# Patient Record
Sex: MALE | Race: WHITE | NOT HISPANIC OR LATINO | Employment: OTHER | URBAN - METROPOLITAN AREA
[De-identification: names, ages, dates, MRNs, and addresses within clinical notes are randomized per-mention and may not be internally consistent; named-entity substitution may affect disease eponyms.]

---

## 2017-02-14 ENCOUNTER — LAB CONVERSION - ENCOUNTER (OUTPATIENT)
Dept: OTHER | Facility: OTHER | Age: 67
End: 2017-02-14

## 2017-02-14 LAB — PROSTATE SPECIFIC ANTIGEN TOTAL (HISTORICAL): 14.2 NG/ML

## 2017-02-15 ENCOUNTER — ALLSCRIPTS OFFICE VISIT (OUTPATIENT)
Dept: OTHER | Facility: OTHER | Age: 67
End: 2017-02-15

## 2017-02-15 LAB
CLARITY UR: NORMAL
COLOR UR: YELLOW
GLUCOSE (HISTORICAL): 2000
HGB UR QL STRIP.AUTO: NORMAL
KETONES UR STRIP-MCNC: NORMAL MG/DL
PH UR STRIP.AUTO: 5 [PH]
PROT UR STRIP-MCNC: NORMAL MG/DL
SP GR UR STRIP.AUTO: 1.02

## 2017-03-10 ENCOUNTER — GENERIC CONVERSION - ENCOUNTER (OUTPATIENT)
Dept: OTHER | Facility: OTHER | Age: 67
End: 2017-03-10

## 2017-03-22 ENCOUNTER — ALLSCRIPTS OFFICE VISIT (OUTPATIENT)
Dept: OTHER | Facility: OTHER | Age: 67
End: 2017-03-22

## 2017-03-22 ENCOUNTER — LAB CONVERSION - ENCOUNTER (OUTPATIENT)
Dept: OTHER | Facility: OTHER | Age: 67
End: 2017-03-22

## 2017-03-22 LAB
BUN SERPL-MCNC: 16 MG/DL (ref 7–25)
BUN/CREA RATIO (HISTORICAL): ABNORMAL (CALC) (ref 6–22)
CALCIUM SERPL-MCNC: 9.2 MG/DL (ref 8.6–10.3)
CHLORIDE SERPL-SCNC: 100 MMOL/L (ref 98–110)
CO2 SERPL-SCNC: 29 MMOL/L (ref 20–31)
CREAT SERPL-MCNC: 0.87 MG/DL (ref 0.7–1.25)
EGFR AFRICAN AMERICAN (HISTORICAL): 103 ML/MIN/1.73M2
EGFR-AMERICAN CALC (HISTORICAL): 89 ML/MIN/1.73M2
GLUCOSE (HISTORICAL): 171 MG/DL (ref 65–99)
HBA1C MFR BLD HPLC: 8.8 % OF TOTAL HGB
POTASSIUM SERPL-SCNC: 4.6 MMOL/L (ref 3.5–5.3)
SODIUM SERPL-SCNC: 136 MMOL/L (ref 135–146)

## 2017-04-05 ENCOUNTER — ALLSCRIPTS OFFICE VISIT (OUTPATIENT)
Dept: OTHER | Facility: OTHER | Age: 67
End: 2017-04-05

## 2017-05-03 ENCOUNTER — TRANSCRIBE ORDERS (OUTPATIENT)
Dept: ADMINISTRATIVE | Facility: HOSPITAL | Age: 67
End: 2017-05-03

## 2017-05-03 DIAGNOSIS — R91.1 COIN LESION: Primary | ICD-10-CM

## 2017-06-13 ENCOUNTER — ALLSCRIPTS OFFICE VISIT (OUTPATIENT)
Dept: OTHER | Facility: OTHER | Age: 67
End: 2017-06-13

## 2017-06-15 ENCOUNTER — ALLSCRIPTS OFFICE VISIT (OUTPATIENT)
Dept: OTHER | Facility: OTHER | Age: 67
End: 2017-06-15

## 2017-06-16 ENCOUNTER — GENERIC CONVERSION - ENCOUNTER (OUTPATIENT)
Dept: OTHER | Facility: OTHER | Age: 67
End: 2017-06-16

## 2017-06-16 ENCOUNTER — LAB CONVERSION - ENCOUNTER (OUTPATIENT)
Dept: OTHER | Facility: OTHER | Age: 67
End: 2017-06-16

## 2017-06-16 LAB
A/G RATIO (HISTORICAL): 1.3 (CALC) (ref 1–2.5)
ALBUMIN SERPL BCP-MCNC: 3.7 G/DL (ref 3.6–5.1)
ALP SERPL-CCNC: 94 U/L (ref 40–115)
ALT SERPL W P-5'-P-CCNC: 19 U/L (ref 9–46)
AST SERPL W P-5'-P-CCNC: 14 U/L (ref 10–35)
BILIRUB SERPL-MCNC: 0.5 MG/DL (ref 0.2–1.2)
BUN SERPL-MCNC: 15 MG/DL (ref 7–25)
BUN/CREA RATIO (HISTORICAL): ABNORMAL (CALC) (ref 6–22)
CALCIUM SERPL-MCNC: 9.4 MG/DL (ref 8.6–10.3)
CHLORIDE SERPL-SCNC: 99 MMOL/L (ref 98–110)
CO2 SERPL-SCNC: 28 MMOL/L (ref 20–31)
CREAT SERPL-MCNC: 0.8 MG/DL (ref 0.7–1.25)
EGFR AFRICAN AMERICAN (HISTORICAL): 106 ML/MIN/1.73M2
EGFR-AMERICAN CALC (HISTORICAL): 91 ML/MIN/1.73M2
GAMMA GLOBULIN (HISTORICAL): 2.8 G/DL (CALC) (ref 1.9–3.7)
GLUCOSE (HISTORICAL): 134 MG/DL (ref 65–99)
HBA1C MFR BLD HPLC: 8 % OF TOTAL HGB
POTASSIUM SERPL-SCNC: 4.5 MMOL/L (ref 3.5–5.3)
SODIUM SERPL-SCNC: 139 MMOL/L (ref 135–146)
TOTAL PROTEIN (HISTORICAL): 6.5 G/DL (ref 6.1–8.1)

## 2017-06-19 DIAGNOSIS — E11.65 TYPE 2 DIABETES MELLITUS WITH HYPERGLYCEMIA (HCC): ICD-10-CM

## 2017-06-19 DIAGNOSIS — I10 ESSENTIAL (PRIMARY) HYPERTENSION: ICD-10-CM

## 2017-06-20 ENCOUNTER — ALLSCRIPTS OFFICE VISIT (OUTPATIENT)
Dept: OTHER | Facility: OTHER | Age: 67
End: 2017-06-20

## 2017-06-28 ENCOUNTER — ALLSCRIPTS OFFICE VISIT (OUTPATIENT)
Dept: OTHER | Facility: OTHER | Age: 67
End: 2017-06-28

## 2017-08-03 ENCOUNTER — LAB CONVERSION - ENCOUNTER (OUTPATIENT)
Dept: OTHER | Facility: OTHER | Age: 67
End: 2017-08-03

## 2017-08-03 LAB — PROSTATE SPECIFIC ANTIGEN TOTAL (HISTORICAL): 17.6 NG/ML

## 2017-08-15 DIAGNOSIS — R97.20 ELEVATED PROSTATE SPECIFIC ANTIGEN (PSA): ICD-10-CM

## 2017-08-16 ENCOUNTER — APPOINTMENT (OUTPATIENT)
Dept: LAB | Facility: HOSPITAL | Age: 67
End: 2017-08-16
Attending: UROLOGY
Payer: MEDICARE

## 2017-08-16 ENCOUNTER — ALLSCRIPTS OFFICE VISIT (OUTPATIENT)
Dept: OTHER | Facility: OTHER | Age: 67
End: 2017-08-16

## 2017-08-16 DIAGNOSIS — R97.20 ELEVATED PROSTATE SPECIFIC ANTIGEN (PSA): ICD-10-CM

## 2017-08-16 PROCEDURE — 87086 URINE CULTURE/COLONY COUNT: CPT

## 2017-08-18 LAB — BACTERIA UR CULT: NORMAL

## 2017-09-13 ENCOUNTER — HOSPITAL ENCOUNTER (OUTPATIENT)
Dept: RADIOLOGY | Facility: MEDICAL CENTER | Age: 67
Discharge: HOME | End: 2017-09-13
Payer: MEDICARE

## 2017-09-13 DIAGNOSIS — R91.1 COIN LESION: ICD-10-CM

## 2017-09-13 PROCEDURE — 71250 CT THORAX DX C-: CPT

## 2017-09-14 ENCOUNTER — GENERIC CONVERSION - ENCOUNTER (OUTPATIENT)
Dept: OTHER | Facility: OTHER | Age: 67
End: 2017-09-14

## 2017-09-14 PROCEDURE — 88344 IMHCHEM/IMCYTCHM EA MLT ANTB: CPT | Performed by: UROLOGY

## 2017-09-14 PROCEDURE — G0416 PROSTATE BIOPSY, ANY MTHD: HCPCS | Performed by: UROLOGY

## 2017-09-15 ENCOUNTER — LAB REQUISITION (OUTPATIENT)
Dept: LAB | Facility: HOSPITAL | Age: 67
End: 2017-09-15
Payer: MEDICARE

## 2017-09-15 DIAGNOSIS — E78.5 HYPERLIPIDEMIA: ICD-10-CM

## 2017-09-15 DIAGNOSIS — R97.20 ELEVATED PROSTATE SPECIFIC ANTIGEN (PSA): ICD-10-CM

## 2017-09-15 DIAGNOSIS — I10 ESSENTIAL (PRIMARY) HYPERTENSION: ICD-10-CM

## 2017-09-15 DIAGNOSIS — E11.65 TYPE 2 DIABETES MELLITUS WITH HYPERGLYCEMIA (HCC): ICD-10-CM

## 2017-09-15 DIAGNOSIS — C61 MALIGNANT NEOPLASM OF PROSTATE (HCC): ICD-10-CM

## 2017-09-18 ENCOUNTER — LAB CONVERSION - ENCOUNTER (OUTPATIENT)
Dept: OTHER | Facility: OTHER | Age: 67
End: 2017-09-18

## 2017-09-18 ENCOUNTER — GENERIC CONVERSION - ENCOUNTER (OUTPATIENT)
Dept: OTHER | Facility: OTHER | Age: 67
End: 2017-09-18

## 2017-09-18 LAB
A/G RATIO (HISTORICAL): 1.5 (CALC) (ref 1–2.5)
ALBUMIN SERPL BCP-MCNC: 3.9 G/DL (ref 3.6–5.1)
ALP SERPL-CCNC: 93 U/L (ref 40–115)
ALT SERPL W P-5'-P-CCNC: 20 U/L (ref 9–46)
AST SERPL W P-5'-P-CCNC: 15 U/L (ref 10–35)
BILIRUB SERPL-MCNC: 0.5 MG/DL (ref 0.2–1.2)
BUN SERPL-MCNC: 11 MG/DL (ref 7–25)
BUN/CREA RATIO (HISTORICAL): ABNORMAL (CALC) (ref 6–22)
CALCIUM SERPL-MCNC: 9.3 MG/DL (ref 8.6–10.3)
CHLORIDE SERPL-SCNC: 102 MMOL/L (ref 98–110)
CHOLEST SERPL-MCNC: 197 MG/DL
CHOLEST/HDLC SERPL: 4.6 (CALC)
CO2 SERPL-SCNC: 26 MMOL/L (ref 20–31)
CREAT SERPL-MCNC: 0.73 MG/DL (ref 0.7–1.25)
CREATININE, RANDOM URINE (HISTORICAL): 167 MG/DL (ref 20–370)
EGFR AFRICAN AMERICAN (HISTORICAL): 110 ML/MIN/1.73M2
EGFR-AMERICAN CALC (HISTORICAL): 95 ML/MIN/1.73M2
GAMMA GLOBULIN (HISTORICAL): 2.6 G/DL (CALC) (ref 1.9–3.7)
GLUCOSE (HISTORICAL): 43 MG/DL (ref 65–99)
HBA1C MFR BLD HPLC: 7.2 % OF TOTAL HGB
HDLC SERPL-MCNC: 43 MG/DL
LDL CHOLESTEROL (HISTORICAL): 129 MG/DL (CALC)
MAGNESIUM, UR (HISTORICAL): 3.6 MG/DL
MICROALBUMIN/CREATININE RATIO (HISTORICAL): 22 MCG/MG CREAT
NON-HDL-CHOL (CHOL-HDL) (HISTORICAL): 154 MG/DL (CALC)
POTASSIUM SERPL-SCNC: 3.9 MMOL/L (ref 3.5–5.3)
SODIUM SERPL-SCNC: 140 MMOL/L (ref 135–146)
TOTAL PROTEIN (HISTORICAL): 6.5 G/DL (ref 6.1–8.1)
TRIGL SERPL-MCNC: 139 MG/DL

## 2017-10-03 ENCOUNTER — ALLSCRIPTS OFFICE VISIT (OUTPATIENT)
Dept: OTHER | Facility: OTHER | Age: 67
End: 2017-10-03

## 2017-10-03 LAB
COLOR UR: YELLOW
HGB UR QL STRIP.AUTO: NORMAL
KETONES UR STRIP-MCNC: -500 MG/DL
LEUKOCYTE ESTERASE UR QL STRIP: NORMAL
PH UR STRIP.AUTO: 5 [PH]
PROT UR STRIP-MCNC: NORMAL MG/DL
SP GR UR STRIP.AUTO: 1

## 2017-10-11 ENCOUNTER — ALLSCRIPTS OFFICE VISIT (OUTPATIENT)
Dept: OTHER | Facility: OTHER | Age: 67
End: 2017-10-11

## 2017-10-13 ENCOUNTER — HOSPITAL ENCOUNTER (OUTPATIENT)
Dept: RADIOLOGY | Age: 67
Discharge: HOME | End: 2017-10-13
Payer: MEDICARE

## 2017-10-13 DIAGNOSIS — C61 MALIGNANT NEOPLASM OF PROSTATE (HCC): ICD-10-CM

## 2017-10-13 PROCEDURE — 78306 BONE IMAGING WHOLE BODY: CPT

## 2017-10-13 PROCEDURE — A9503 TC99M MEDRONATE: HCPCS

## 2017-10-13 PROCEDURE — 74177 CT ABD & PELVIS W/CONTRAST: CPT

## 2017-10-13 RX ADMIN — IOHEXOL 100 ML: 350 INJECTION, SOLUTION INTRAVENOUS at 10:02

## 2017-10-18 ENCOUNTER — GENERIC CONVERSION - ENCOUNTER (OUTPATIENT)
Dept: OTHER | Facility: OTHER | Age: 67
End: 2017-10-18

## 2017-10-24 ENCOUNTER — GENERIC CONVERSION - ENCOUNTER (OUTPATIENT)
Dept: OTHER | Facility: OTHER | Age: 67
End: 2017-10-24

## 2017-10-24 ENCOUNTER — APPOINTMENT (OUTPATIENT)
Dept: RADIATION ONCOLOGY | Facility: HOSPITAL | Age: 67
End: 2017-10-24
Payer: MEDICARE

## 2017-10-24 PROCEDURE — 99215 OFFICE O/P EST HI 40 MIN: CPT | Performed by: RADIOLOGY

## 2017-10-31 NOTE — PROGRESS NOTES
Assessment    1  Uncontrolled type 2 diabetes mellitus, with long-term current use of insulin (250 02,V58 67) (E11 65,Z79 4)   2  Hyperlipidemia (272 4) (E78 5)   3  Hypertension (401 9) (I10)    Plan  Hyperlipidemia, Hypertension, Uncontrolled type 2 diabetes mellitus, with long-termcurrent use of insulin    · (1) COMPREHENSIVE METABOLIC PANEL; Status:Active; Requested KCZ:[id number]; Perform:[facility]; ZHA:[id number];FRFNIXW; Hypertension, Uncontrolled type 2 diabetes mellitus, with long-term current use of insulin; Ordered By:Garrett Tinoco;   · (1) MICROALBUMIN CREATININE RATIO, RANDOM URINE; Status:Active; Requestedfor:01Jan2018; Perform:[facility]; SVB:66YPO9051;WWNSHHT; Hypertension, Uncontrolled type 2 diabetes mellitus, with long-term current use of insulin; Ordered By:Garrett Tinoco;   · Follow-up visit in 4 Months Evaluation and Treatment  Follow-up  Status: Complete Done: 89YWH1069   Ordered; Hyperlipidemia, Hypertension, Uncontrolled type 2 diabetes mellitus, with long-term current use of insulin; Ordered By: Pricilla Sandifer Performed:  Due: 42IUG0692; Last Updated By: Media Ode; 10/11/2017 1:21:44 PM  Hyperlipidemia, Uncontrolled type 2 diabetes mellitus, with long-term current use ofinsulin    · (1) LIPID PANEL, FASTING; Status:Active; Requested LFT:[id number]; Perform:[facility]; USK:[id number];VHEGBSV;CDG:NCSIUKCNPTNMZG, Uncontrolled type 2 diabetes mellitus, with long-term current use of insulin; Ordered By:Garrett Tinoco; Uncontrolled type 2 diabetes mellitus, with long-term current use of insulin    · OneTouch Ultra 2 w/Device Kit; USE AS DIRECTED   Rx By: Pricilla Sandifer; Dispense: 0 Days ; #:1 Kit;  Refill: 0;Uncontrolled type 2 diabetes mellitus, with long-term current use of insulin; NIKKO = N; Verified Transmission to [facility]164; Last Updated By: System, SureScripts; 10/11/2017 1:19:42 PM   · NovoLOG FlexPen 100 UNIT/ML Subcutaneous Solution Pen-injector; 26 untisbefore breakfast and dinner and 24 before lunch   Rx By: Helen Agudelo; Dispense: 30 Days ; #:1 X 3 ML Pen (5 Pens); Refill: 5;Uncontrolled type 2 diabetes mellitus, with long-term current use of insulin; NIKKO = N; Verified Transmission to [facility]; Last Updated By: System, SureScripts; 10/11/2017 1:19:42 PM   · Toujeo SoloStar 300 UNIT/ML Subcutaneous Solution Pen-injector; take 70 units at bedtime   Rx By: Helen Agudelo; Dispense: 90 Days ; #:3 X 1 5 ML Pen (3 Pens); Refill: 3;For: Uncontrolled type 2 diabetes mellitus, with long-term current use of insulin; NIKKO = N; Verified Transmission to [place] [address]; Last Updated By: System, SureScripts; 10/11/2017 1:19:41 PM   · (1) HEMOGLOBIN A1C; Status:Active; Requested OGU:[id number]; Perform:[facility]; YGV:56OJJ9056;YSHAYOUNGO;OWCV 2 diabetes mellitus, with long-term current use of insulin; Ordered By:Cole Tinoco;    Discussion/Summary   uncontrolled type 2 diabetes with long-term insulin therapy - hemoglobin A1c improved and almost at goal- patient denies any hypoglycemic episodes  increase to do to 70 units at bedtime, NovoLog 26 units  before breakfast and dinner and 24 before lunch  Continue to monitor blood sugars 3 times a day and sent over fingerstick log in 2 weeks  2  Hyperlipidemia - LDL above goal, continue atorvastatin, focus on dietary modifications  And repeat labs prior to next visit  3  Hypertension- blood pressure at goal, continue current regimen Counseling Documentation With Imm: The patient was counseled regarding diagnostic results,-- instructions for management,-- risk factor reductions,-- impressions,-- risks and benefits of treatment options,-- importance of compliance with treatment  Patient's Capacity to Self-Care: Patient is able to Self-Care  Medication SE Review and Pt Understands Tx: Possible side effects of new medications were reviewed with the patient/guardian today   The treatment plan was reviewed with the patient/guardian  The patient/guardian understands and agrees with the treatment plan      Chief Complaint  Chief Complaint Free Text Note Form: Follow up  History of Present Illness  Diabetes: The patient is being seen for routine follow-up of Diabetes Mellitus 2  Current treatment includes Basal Insulin-- and-- NovoLog  See Medication List for current medication(s)  See Medication List for dosage(s)  Source of information reported by review of the patient's logbook and indicates that the patient checks his blood sugar four times per day  Fasting blood sugars: generally 150-200  Pre-prandial blood sugars: generally 150-200  Symptoms reported by the patient include extremity numbness-- and-- extremity paresthesias, but-- no polydipsia,-- no polyuria,-- no blurred vision,-- no lower extremity ulcers,-- no recent weight gain,-- no nausea,-- no recent weight loss-- and-- no edema  Family History: no diabetes mellitus type 1-- and-- no diabetes mellitus type 2  Hyperlipidemia (Follow-Up): The patient states his hyperlipidemia has been under good control since the last visit  Comorbid Illnesses: diabetes mellitus-- and-- hypertension  Symptoms: denies chest pain,-- denies intermittent leg claudication,-- denies muscle pain-- and-- denies muscle weakness  Associated symptoms include no focal neurologic deficits-- and-- no memory loss  Medications: the patient is adherent with his medication regimen  -- He denies medication side effects  Medication(s): a statin  Hypertension (Follow-Up): The patient presents for follow-up of essential hypertension  Symptoms: denies impaired vision,-- stable dyspnea,-- denies chest pain,-- denies intermittent leg claudication-- and-- denies lower extremity edema  Associated symptoms include no headache,-- no focal neurologic deficits-- and-- no memory loss  Medications: the patient is adherent with his medication regimen  -- He denies medication side effects  Medication(s): an ACE inhibitor  Review of Systems  Endo Adult ROS Male Established v2 - [facility]:  Constitutional/General: no recent weight gain,-- no recent weight loss,-- no poor energy/fatigue,-- no increased energy level,-- no insomnia/sleep problems,-- no fever-- and-- no feeling weak  Heart: no high blood pressure,-- no chest pain/tightness,-- no rapid/racing heart rate-- and-- no palpitations  Genitourinary - Urinary no frequent urination,-- no excess urination-- and-- no urinating during the night  Eyes: no blurred vision,-- no double vision,-- no bulging eyes,-- no gritty/scratchy eyes-- and-- no excessive tearing  Mouth / Throat: no hoarseness-- and-- no difficulty swallowing  Neck: no lumps,-- no swollen glands,-- no neck pain,-- no neck stiffness-- and-- no enlarged thyroid  Respiratory: no wheezing,-- no asthma-- and-- no persistent cough  Musculoskeletal: no muscle aches/pain,-- no joint aches/pain-- and-- no muscle weakness  Skin & Hair: no dry skin,-- no acne,-- the hair texture was not oily,-- no hair loss-- and-- no excessive hair growth  Gastrointestinal: no constipation,-- no diarrhea,-- no waking at night to drink-- and-- no stomach ache  Neurological: no blackouts,-- no weakness-- and-- no tremors  Genital: no testicular pain,-- no testicular lumps/bumps/mass-- and-- does not perform monthly testicular exam   Endocrine: no feeling hot frequently,-- no feeling cold frequently,-- no shifts between feeling hot and cold,-- no cold hands or feet,-- no excessive sweating,-- no thyroid problems,-- no blood sugar problems,-- no excessive thirst,-- no excessive hunger,-- no change in shoe size,-- no nausea or vomiting-- and-- no shaky hands  ROS Reviewed:   ROS reviewed  Active Problems  1  Adenocarcinoma of prostate (185) (C61)   2  Elevated PSA (790 93) (R97 20)   3  Hyperlipidemia (272 4) (E78 5)   4  Hypertension (401 9) (I10)   5   Obesity (278 00) (E66 9)    Past Medical History  1  History of Coronary artery disease (414 00) (I25 10)   2  History of cardiac disorder (V12 50) (Z86 79)  Active Problems And Past Medical History Reviewed: The active problems and past medical history were reviewed and updated today  Surgical History  1  History of Cath Stent 1 Initial   2  History of Needle Biopsy Of Prostate  Surgical History Reviewed: The surgical history was reviewed and updated today  Family History  Family History Reviewed: The family history was reviewed and updated today  Social History     · Daily caffeine consumption   · Never a smoker   · No drug use   · Occasional alcohol use  Social History Reviewed: The social history was reviewed and updated today  Current Meds   1  Aspirin 325 MG Oral Tablet; Therapy: (Recorded:51Zyf2134) to Recorded   2  Atorvastatin Calcium 40 MG Oral Tablet; Therapy: (Recorded:86Ogx5115) to Recorded   3  BD Pen Needle Pam U/F 32G X 4 MM Miscellaneous; USE 4 TIMES A DAY; Therapy: 94RHS9970 to (BXEPYFNZ:77CIB7922)  Requested for: 20XPA7834; Last Rx:15Oxt2323 Ordered   4  Breo Ellipta 100-25 MCG/INH Inhalation Aerosol Powder Breath Activated; Therapy: (Recorded:02Mar2016) to Recorded   5  Effient 10 MG Oral Tablet; Therapy: (Recorded:98Avu5841) to Recorded   6  Famotidine 20 MG Oral Tablet; Therapy: (Recorded:83Iqu4027) to Recorded   7  Flonase 50 MCG/ACT SUSP; Therapy: (Recorded:19Fwh4668) to Recorded   8  HydroCHLOROthiazide 12 5 MG Oral Tablet; Therapy: (Recorded:02Mar2016) to Recorded   9  Lisinopril 2 5 MG Oral Tablet; Therapy: (Recorded:54Suv8595) to Recorded   10  Metoprolol Tartrate 100 MG Oral Tablet; Therapy: (Recorded:40Hhb3957) to Recorded   11  NovoLOG FlexPen 100 UNIT/ML Subcutaneous Solution Pen-injector; 24 untis before  meals; Therapy: 39UFA2006 to (OENJAHGL:10ICW6289)  Requested for: 58Wud1499; Last  Rx:03Nuh0261 Ordered   12   OneTouch Lancets Miscellaneous; USE TO TEST 4  Times daily; Therapy: 22JUF4678 to (TJKYGQTO:17ZCW8421)  Requested for: 90EEC3936; Last  Rx:54Lsh8398 Ordered   13  OneTouch Ultra Blue In [employer]; check BS 4 times daily; Therapy: 83UTO6771 to (Evaluate:28Jan2018)  Requested for: 42Ukq6367; Last  Rx:72Vmn8249 Ordered   14  Toujeo SoloStar 300 UNIT/ML Subcutaneous Solution Pen-injector; take 65 units  at  bedtime  Requested for: 69RBM5259; Last Rx:05Oct2017 Ordered   15  Ventolin  (90 Base) MCG/ACT Inhalation Aerosol Solution; Therapy: (Recorded:02Mar2016) to Recorded  Medication List Reviewed: The medication list was reviewed and updated today  Allergies  1  No Known Drug Allergies    Vitals  Vital Signs    Recorded: 90JAE0753 12:56PM   Heart Rate 68   Systolic 341   Diastolic 68   Height 5 ft 5 in   Weight 212 lb 0 96 oz   BMI Calculated 35 29   BSA Calculated 2 03       Physical Exam   Constitutional  General appearance: No acute distress, well appearing and well nourished  Eyes  Conjunctiva and lids: No swelling, erythema, or discharge  Pupils: Equal, round and reactive to light  The sclera are anicteric  Extraocular movements are intact  Ears, Nose, Mouth, and Throat  External inspection of ears, nose and lips: Normal    Exam of Head: The head is atraumatic and normocephalic  Neck: The neck is supple  The thyroid is normal in size with no palpable nodules  Pulmonary  Auscultation of lungs: Clear to auscultation bilaterally with normal chest expansion  Cardiovascular  Auscultation of heart: Normal rate and rhythm with no murmurs, gallops or rubs  Examination of extremities for edema and/or varicosities: Normal    Abdomen  Abdomen: Abdomen is soft, non-tender with normal bowel sounds  Lymphatic  Palpation of lymph nodes: No supraclavicular or suboccipital lymphadenopathy     Musculoskeletal  Gait and station: Normal    Inspection/palpation of joints, bones, and muscles: Muscle bulk and tone is normal    Skin  Skin and subcutaneous tissue: Normal skin temperature and color  Neurologic  Motor Strength: Strength is 5/5 bilaterally  Psychiatric  Orientation to person, place and time: Normal    Mood and affect: Affect and attention span are normal        Results/Data  *VB - Foot Exam 92OJO1838 12:00AM Sandra Yoo     Test Name Result Flag Reference   FOOT EXAM 63JHG9487       (1) LIPID PANEL, FASTING 97Csa9623 08:54AM Emelia Pennschner     Test Name Result Flag Reference   CHOLESTEROL, TOTAL 197 mg/dL  <200   HDL CHOLESTEROL 43 mg/dL  >13   TRIGLICERIDES 145 mg/dL  <150   LDL-CHOLESTEROL 129 mg/dL (calc) H      Reference range: <100   Desirable range <100 mg/dL for patients with CHD or diabetes and <70 mg/dL for diabetic patients with known heart disease  LDL-C is now calculated using the Bennie-Lacey  calculation, which is a validated novel method providing  better accuracy than the Friedewald equation in the  estimation of LDL-C  Rachana Mathew  [place] [address];494(91): 7478-2221  (http://[employer]/faq/AAT266)   CHOL/HDLC RATIO 4 6 (calc)  <5 0   NON HDL CHOLESTEROL 154 mg/dL (calc) H <130     For patients with diabetes plus 1 major ASCVD risk  factor, treating to a non-HDL-C goal of <100 mg/dL  (LDL-C of <70 mg/dL) is considered a therapeutic  option  (1) COMPREHENSIVE METABOLIC PANEL 34GTW9129 62:30VK Emelia Pennschner     Test Name Result Flag Reference   GLUCOSE 43 mg/dL L 65-99   Fasting reference interval   UREA NITROGEN (BUN) 11 mg/dL  7-25   CREATININE 0 73 mg/dL  0 70-1 25     For patients >52years of age, the reference limit for Creatinine is approximately 13% higher for people identified as -American  eGFR NON-AFR   AMERICAN 95 mL/min/1 73m2  > OR = 60   eGFR AFRICAN AMERICAN 110 mL/min/1 73m2  > OR = 60   BUN/CREATININE RATIO   4-36   NOT APPLICABLE (calc)   SODIUM 140 mmol/L  135-146   POTASSIUM 3 9 mmol/L  3 5-5 3   CHLORIDE 102 mmol/L     CARBON DIOXIDE 26 mmol/L  20-31   CALCIUM 9 3 mg/dL  8 6-10 3   PROTEIN, TOTAL 6 5 g/dL  6 1-8 1   ALBUMIN 3 9 g/dL  3 6-5 1   GLOBULIN 2 6 g/dL (calc)  1 9-3 7   ALBUMIN/GLOBULIN RATIO 1 5 (calc)  1 0-2 5   BILIRUBIN, TOTAL 0 5 mg/dL  0 2-1 2   ALKALINE PHOSPHATASE 93 U/L     AST 15 U/L  10-35   ALT 20 U/L  9-46     (Q) MICROALBUMIN, RANDOM URINE (W/CREATININE) 28Vjn1206 08:54AM Marlen Penn     Test Name Result Flag Reference   CREATININE, RANDOM URINE 167 mg/dL     MICROALBUMIN 3 6 mg/dL       Reference Range Not established   MICROALBUMIN/CREATININE$RATIO, RANDOM URINE 22 mcg/mg creat  <30     The ADA defines abnormalities in albumin excretion as follows:   Category         Result (mcg/mg creatinine)   Normal                    <30 Microalbuminuria           Clinical albuminuria   > OR = 300   The ADA recommends that at least two of three specimens collected within a 3-6 month period be abnormal before considering a patient to be within a diagnostic category  (Q) HEMOGLOBIN A1c 63Ney9916 08:54AM Vaughn Penn     REPORT COMMENT: FASTING:YES     Test Name Result Flag Reference   HEMOGLOBIN A1c 7 2 % of total Hgb H <5 7     For someone without known diabetes, a hemoglobin A1c value of 6 5% or greater indicates that they may have  diabetes and this should be confirmed with a follow-up  test    For someone with known diabetes, a value <7% indicates  that their diabetes is well controlled and a value  greater than or equal to 7% indicates suboptimal  control  A1c targets should be individualized based on  duration of diabetes, age, comorbid conditions, and  other considerations  Currently, no consensus exists regarding use of hemoglobin A1c for diagnosis of diabetes for children  Future Appointments    Date/Time Provider Specialty Site   10/18/2017 11:00 AM LAMBERTO Maki  Urology [facility] [place]   02/07/2018 01:00 PM LAMBERTO Echavarria   Endocrinology [address] ENDOCRINOLOGY       Signatures Electronically signed by : LAMBERTO Arango ; Oct 11 2017  1:52PM EST                       (Author)

## 2017-11-02 ENCOUNTER — GENERIC CONVERSION - ENCOUNTER (OUTPATIENT)
Dept: OTHER | Facility: OTHER | Age: 67
End: 2017-11-02

## 2017-11-08 ENCOUNTER — APPOINTMENT (OUTPATIENT)
Dept: RADIATION ONCOLOGY | Facility: CLINIC | Age: 67
End: 2017-11-08
Attending: RADIOLOGY
Payer: MEDICARE

## 2017-11-08 PROCEDURE — 77334 RADIATION TREATMENT AID(S): CPT | Performed by: RADIOLOGY

## 2017-11-08 PROCEDURE — 77290 THER RAD SIMULAJ FIELD CPLX: CPT | Performed by: RADIOLOGY

## 2017-11-10 ENCOUNTER — GENERIC CONVERSION - ENCOUNTER (OUTPATIENT)
Dept: OTHER | Facility: OTHER | Age: 67
End: 2017-11-10

## 2017-11-13 ENCOUNTER — APPOINTMENT (OUTPATIENT)
Dept: RADIATION ONCOLOGY | Facility: HOSPITAL | Age: 67
End: 2017-11-13
Payer: MEDICARE

## 2017-11-17 PROCEDURE — 77338 DESIGN MLC DEVICE FOR IMRT: CPT | Performed by: RADIOLOGY

## 2017-11-17 PROCEDURE — 77301 RADIOTHERAPY DOSE PLAN IMRT: CPT | Performed by: RADIOLOGY

## 2017-11-17 PROCEDURE — 77300 RADIATION THERAPY DOSE PLAN: CPT | Performed by: RADIOLOGY

## 2017-11-21 PROCEDURE — 77385 HB NTSTY MODUL RAD TX DLVR SMPL: CPT | Performed by: RADIOLOGY

## 2017-11-22 PROCEDURE — 77385 HB NTSTY MODUL RAD TX DLVR SMPL: CPT | Performed by: RADIOLOGY

## 2017-11-24 PROCEDURE — 77385 HB NTSTY MODUL RAD TX DLVR SMPL: CPT | Performed by: RADIOLOGY

## 2017-11-27 PROCEDURE — 77385 HB NTSTY MODUL RAD TX DLVR SMPL: CPT | Performed by: RADIOLOGY

## 2017-11-28 PROCEDURE — 77336 RADIATION PHYSICS CONSULT: CPT | Performed by: RADIOLOGY

## 2017-11-28 PROCEDURE — 77417 THER RADIOLOGY PORT IMAGE(S): CPT | Performed by: RADIOLOGY

## 2017-11-28 PROCEDURE — 77385 HB NTSTY MODUL RAD TX DLVR SMPL: CPT | Performed by: RADIOLOGY

## 2017-11-29 PROCEDURE — 77385 HB NTSTY MODUL RAD TX DLVR SMPL: CPT | Performed by: RADIOLOGY

## 2017-11-30 PROCEDURE — 77385 HB NTSTY MODUL RAD TX DLVR SMPL: CPT | Performed by: RADIOLOGY

## 2017-12-01 ENCOUNTER — APPOINTMENT (OUTPATIENT)
Dept: RADIATION ONCOLOGY | Facility: HOSPITAL | Age: 67
End: 2017-12-01
Payer: MEDICARE

## 2017-12-01 PROCEDURE — 77385 HB NTSTY MODUL RAD TX DLVR SMPL: CPT | Performed by: RADIOLOGY

## 2017-12-04 PROCEDURE — 77385 HB NTSTY MODUL RAD TX DLVR SMPL: CPT | Performed by: RADIOLOGY

## 2017-12-05 PROCEDURE — 77336 RADIATION PHYSICS CONSULT: CPT | Performed by: RADIOLOGY

## 2017-12-05 PROCEDURE — 77417 THER RADIOLOGY PORT IMAGE(S): CPT | Performed by: RADIOLOGY

## 2017-12-05 PROCEDURE — 77385 HB NTSTY MODUL RAD TX DLVR SMPL: CPT | Performed by: RADIOLOGY

## 2017-12-06 PROCEDURE — 77385 HB NTSTY MODUL RAD TX DLVR SMPL: CPT | Performed by: RADIOLOGY

## 2017-12-07 PROCEDURE — 77385 HB NTSTY MODUL RAD TX DLVR SMPL: CPT | Performed by: RADIOLOGY

## 2017-12-08 PROCEDURE — 77385 HB NTSTY MODUL RAD TX DLVR SMPL: CPT | Performed by: RADIOLOGY

## 2017-12-11 PROCEDURE — 77385 HB NTSTY MODUL RAD TX DLVR SMPL: CPT | Performed by: RADIOLOGY

## 2017-12-12 PROCEDURE — 77387 GUIDANCE FOR RADJ TX DLVR: CPT | Performed by: RADIOLOGY

## 2017-12-12 PROCEDURE — 77336 RADIATION PHYSICS CONSULT: CPT | Performed by: RADIOLOGY

## 2017-12-12 PROCEDURE — 77385 HB NTSTY MODUL RAD TX DLVR SMPL: CPT | Performed by: RADIOLOGY

## 2017-12-12 PROCEDURE — 77417 THER RADIOLOGY PORT IMAGE(S): CPT | Performed by: RADIOLOGY

## 2017-12-13 PROCEDURE — 77385 HB NTSTY MODUL RAD TX DLVR SMPL: CPT | Performed by: RADIOLOGY

## 2017-12-14 PROCEDURE — 77385 HB NTSTY MODUL RAD TX DLVR SMPL: CPT | Performed by: RADIOLOGY

## 2017-12-15 PROCEDURE — 77385 HB NTSTY MODUL RAD TX DLVR SMPL: CPT | Performed by: RADIOLOGY

## 2017-12-18 PROCEDURE — 77385 HB NTSTY MODUL RAD TX DLVR SMPL: CPT | Performed by: RADIOLOGY

## 2017-12-19 PROCEDURE — 77417 THER RADIOLOGY PORT IMAGE(S): CPT | Performed by: RADIOLOGY

## 2017-12-19 PROCEDURE — 77385 HB NTSTY MODUL RAD TX DLVR SMPL: CPT | Performed by: RADIOLOGY

## 2017-12-19 PROCEDURE — 77336 RADIATION PHYSICS CONSULT: CPT | Performed by: RADIOLOGY

## 2017-12-20 PROCEDURE — 77385 HB NTSTY MODUL RAD TX DLVR SMPL: CPT | Performed by: RADIOLOGY

## 2017-12-21 PROCEDURE — 77385 HB NTSTY MODUL RAD TX DLVR SMPL: CPT | Performed by: RADIOLOGY

## 2017-12-22 PROCEDURE — 77385 HB NTSTY MODUL RAD TX DLVR SMPL: CPT | Performed by: RADIOLOGY

## 2017-12-25 ENCOUNTER — APPOINTMENT (EMERGENCY)
Dept: RADIOLOGY | Facility: HOSPITAL | Age: 67
End: 2017-12-25
Payer: MEDICARE

## 2017-12-25 ENCOUNTER — HOSPITAL ENCOUNTER (EMERGENCY)
Facility: HOSPITAL | Age: 67
Discharge: HOME | End: 2017-12-25
Attending: EMERGENCY MEDICINE | Admitting: EMERGENCY MEDICINE
Payer: MEDICARE

## 2017-12-25 VITALS
RESPIRATION RATE: 18 BRPM | SYSTOLIC BLOOD PRESSURE: 119 MMHG | OXYGEN SATURATION: 94 % | TEMPERATURE: 98.3 F | DIASTOLIC BLOOD PRESSURE: 64 MMHG | WEIGHT: 219.36 LBS | HEART RATE: 71 BPM

## 2017-12-25 DIAGNOSIS — S51.812A SKIN TEAR OF FOREARM WITHOUT COMPLICATION, LEFT, INITIAL ENCOUNTER: ICD-10-CM

## 2017-12-25 DIAGNOSIS — S69.92XA HAND INJURY, LEFT, INITIAL ENCOUNTER: Primary | ICD-10-CM

## 2017-12-25 PROCEDURE — 90715 TDAP VACCINE 7 YRS/> IM: CPT | Performed by: PHYSICIAN ASSISTANT

## 2017-12-25 PROCEDURE — 99283 EMERGENCY DEPT VISIT LOW MDM: CPT

## 2017-12-25 PROCEDURE — 73130 X-RAY EXAM OF HAND: CPT

## 2017-12-25 PROCEDURE — 90471 IMMUNIZATION ADMIN: CPT

## 2017-12-25 RX ADMIN — TETANUS TOXOID, REDUCED DIPHTHERIA TOXOID AND ACELLULAR PERTUSSIS VACCINE, ADSORBED 0.5 ML: 5; 2.5; 8; 8; 2.5 SUSPENSION INTRAMUSCULAR at 21:11

## 2017-12-26 PROCEDURE — 77385 HB NTSTY MODUL RAD TX DLVR SMPL: CPT | Performed by: RADIOLOGY

## 2017-12-26 NOTE — ED PROVIDER NOTES
History  Chief Complaint   Patient presents with    Fall     c/o left hand bruise/swelling, left forearm laceration s/p falling going up steps approx 2 hrs ago  +Blood thinners  Last Tetanus unknown     38-year-old male with past medical history of COPD, diabetes, prostate cancer, presents emergency department for mechanical fall while going up steps resulting in laceration to the left forearm  Patient is taking warfarin daily  He endorses some pain to the 3rd finger of the left hand  Denies numbness, tingling, weakness  Denies fevers or chills  Denies head strike or loss of consciousness  Denies neck pain/stiffness  Denies chest pain, shortness of breath, palpitations before or after the event  No other complaints at this time  Tetanus is not up-to-date  None       Past Medical History:   Diagnosis Date    [facility]     [facility] ([facility] )     Malignant neoplasm of prostate ([facility] )        Past Surgical History:   Procedure Laterality Date    CARDIAC SURGERY         History reviewed  No pertinent family history  I have reviewed and agree with the history as documented  Social History   Substance Use Topics    Smoking status: Former Smoker    Smokeless tobacco: Never Used    Alcohol use No        Review of Systems   Constitutional: Negative for chills and fever  Musculoskeletal: Positive for arthralgias and joint swelling  Skin: Positive for color change and wound  All other systems reviewed and are negative        Physical Exam  ED Triage Vitals [12/25/17 2015]   Temperature Pulse Respirations Blood Pressure SpO2   98 3 °F (36 8 °C) 76 18 141/67 94 %      Temp Source Heart Rate Source Patient Position - Orthostatic VS BP Location FiO2 (%)   Oral Monitor Lying Right arm --      Pain Score       2           Orthostatic Vital Signs  Vitals:    12/25/17 2015 12/25/17 2108   BP: 141/67 119/64   Pulse: 76 71   Patient Position - Orthostatic VS: Lying Lying       Physical Exam   Constitutional: He is oriented to person, place, and time  He appears well-developed and well-nourished  Eyes: Conjunctivae and EOM are normal  Pupils are equal, round, and reactive to light  Neck: Normal range of motion  Neck supple  Cardiovascular: Normal rate, regular rhythm and intact distal pulses  Pulmonary/Chest: Effort normal and breath sounds normal  He has no wheezes  He has no rales  Musculoskeletal: Normal range of motion  He exhibits no edema or tenderness  There is ecchymosis and mild pain with hematoma with palpation to the base of the 3rd left dorsal finger  There is a 2 x 3 cm skin tear to the left dorsal forearm  Bleeding is currently controlled  Neurological: He is alert and oriented to person, place, and time  No cranial nerve deficit or sensory deficit  He exhibits normal muscle tone  Coordination normal    Median radial and ulnar nerves of the left upper extremity are intact  Skin: Skin is warm and dry  Capillary refill takes less than 2 seconds  Psychiatric: He has a normal mood and affect  His behavior is normal    Nursing note and vitals reviewed  ED Medications  Medications   tetanus-diphtheria-acellular pertussis (BOOSTRIX) IM injection 0 5 mL (0 5 mL Intramuscular Given 12/25/17 2111)       Diagnostic Studies  Results Reviewed     None                 XR hand 3+ vw left   ED Interpretation by Satnam Joseph PA-C (12/25 2148)   No acute fracture or dislocation  by Eric Guerra (12/25 2126)                 Procedures  Procedures       Phone Contacts  ED Phone Contact    ED Course  ED Course                                MDM  Number of Diagnoses or Management Options  Diagnosis management comments: 75-year-old male with past medical history of COPD, diabetes, prostate cancer, presents emergency department for mechanical fall while going up steps resulting in laceration to the left forearm    X-ray was performed to rule out fracture dislocation  On my evaluation of the x-ray there is no fracture or dislocation visualized  Likely sprain/strain  Tetanus was updated  Wound to the left forearm was irrigated and dressed in Xeroform and compressive dressing  Patient will be discharged home with infection precautions  Amount and/or Complexity of Data Reviewed  Tests in the radiology section of CPT®: ordered and reviewed      CritCare Time    Disposition  Final diagnoses:   Hand injury, left, initial encounter   Skin tear of forearm without complication, left, initial encounter     Time reflects when diagnosis was documented in both MDM as applicable and the Disposition within this note     Time User Action Codes Description Comment    12/25/2017 10:05 PM Karla Napier Add [Z56 14BM] Hand injury, left, initial encounter     12/25/2017 10:05 PM Karla Napier Add [D30 742E] Skin tear of forearm without complication, left, initial encounter       ED Disposition     ED Disposition Condition Comment    Discharge  [address]  discharge to home/self care  Condition at discharge: Good        Follow-up Information     Follow up With Specialties Details Why Gregory 57, DO Family Medicine In 1 week As needed [address] [place] [address]  [phone]          Patient's Medications    No medications on file     No discharge procedures on file      ED Provider  Electronically Signed by           Noa Syed PA-C  12/25/17 7587

## 2017-12-26 NOTE — DISCHARGE INSTRUCTIONS
Follow up with her primary care doctor in 5-7 days for re-evaluation  Please return to emergency department via fevers, chill, redness, swelling, abnormal discharge from the wound, or any other concerns  Laceration   WHAT YOU NEED TO KNOW:   A laceration is an injury to the skin and the soft tissue underneath it  Lacerations happen when you are cut or hit by something  They can happen anywhere on the body  DISCHARGE INSTRUCTIONS:   Return to the emergency department if:   · You have heavy bleeding or bleeding that does not stop after 10 minutes of holding firm, direct pressure over the wound  · Your wound opens up  Contact your healthcare provider if:   · You have a fever or chills  · Your laceration is red, warm, or swollen  · You have red streaks on your skin coming from your wound  · You have white or yellow drainage from the wound that smells bad  · You have pain that gets worse, even after treatment  · You have questions or concerns about your condition or care  Medicines:   · Prescription pain medicine  may be given  Ask how to take this medicine safely  · Antibiotics  help treat or prevent a bacterial infection  · Take your medicine as directed  Contact your healthcare provider if you think your medicine is not helping or if you have side effects  Tell him or her if you are allergic to any medicine  Keep a list of the medicines, vitamins, and herbs you take  Include the amounts, and when and why you take them  Bring the list or the pill bottles to follow-up visits  Carry your medicine list with you in case of an emergency  Care for your wound as directed:   · Do not get your wound wet  until your healthcare provider says it is okay  Do not soak your wound in water  Do not go swimming until your healthcare provider says it is okay  Carefully wash the wound with soap and water  Gently pat the area dry or allow it to air dry       · Change your bandages  when they get wet, dirty, or after washing  Apply new, clean bandages as directed  Do not apply elastic bandages or tape too tight  Do not put powders or lotions over your incision  · Apply antibiotic ointment as directed  Your healthcare provider may give you antibiotic ointment to put over your wound if you have stitches  If you have strips of tape over your incision, let them dry up and fall off on their own  If they do not fall off within 14 days, gently remove them  If you have glue over your wound, do not remove or pick at it  If your glue comes off, do not replace it with glue that you have at home  · Check your wound every day for signs of infection such as swelling, redness, or pus  Self-care:   · Apply ice  on your wound for 15 to 20 minutes every hour or as directed  Use an ice pack, or put crushed ice in a plastic bag  Cover it with a towel  Ice helps prevent tissue damage and decreases swelling and pain  · Use a splint as directed  A splint will decrease movement and stress on your wound  It may help it heal faster  A splint may be used for lacerations over joints or areas of your body that bend  Ask your healthcare provider how to apply and remove a splint  · Decrease scarring of your wound  by applying ointments as directed  Do not apply ointments until your healthcare provider says it is okay  You may need to wait until your wound is healed  Ask which ointment to buy and how often to use it  After your wound is healed, use sunscreen over the area when you are out in the sun  You should do this for at least 6 months to 1 year after your injury  Follow up with your healthcare provider as directed: You may need to follow up in 24 to 48 hours to have your wound checked for infection  You will need to return in 3 to 14 days if you have stitches or staples so they can be removed   Care for your wound as directed to prevent infection and help it heal  Write down your questions so you remember to ask them during your visits  © 2017 [address] [employer] Information is for End User's use only and may not be sold, redistributed or otherwise used for commercial purposes  All illustrations and images included in CareNotes® are the copyrighted property of A D A M , Inc  or Jani Stokes  The above information is an  only  It is not intended as medical advice for individual conditions or treatments  Talk to your doctor, nurse or pharmacist before following any medical regimen to see if it is safe and effective for you

## 2017-12-26 NOTE — ED NOTES
Pt awake and alert, no distress noted, no other questions upon d/c     April M Lily Ogden RN  12/25/17 3310

## 2017-12-27 PROCEDURE — 77336 RADIATION PHYSICS CONSULT: CPT | Performed by: RADIOLOGY

## 2017-12-27 PROCEDURE — 77385 HB NTSTY MODUL RAD TX DLVR SMPL: CPT | Performed by: RADIOLOGY

## 2017-12-28 PROCEDURE — 77385 HB NTSTY MODUL RAD TX DLVR SMPL: CPT | Performed by: RADIOLOGY

## 2017-12-29 PROCEDURE — 77385 HB NTSTY MODUL RAD TX DLVR SMPL: CPT | Performed by: RADIOLOGY

## 2018-01-01 DIAGNOSIS — E11.65 TYPE 2 DIABETES MELLITUS WITH HYPERGLYCEMIA (HCC): ICD-10-CM

## 2018-01-01 DIAGNOSIS — I10 ESSENTIAL (PRIMARY) HYPERTENSION: ICD-10-CM

## 2018-01-01 DIAGNOSIS — E78.5 HYPERLIPIDEMIA: ICD-10-CM

## 2018-01-02 ENCOUNTER — LAB CONVERSION - ENCOUNTER (OUTPATIENT)
Dept: OTHER | Facility: OTHER | Age: 68
End: 2018-01-02

## 2018-01-02 ENCOUNTER — RADIATION THERAPY TREATMENT (OUTPATIENT)
Dept: RADIATION ONCOLOGY | Facility: HOSPITAL | Age: 68
End: 2018-01-02
Payer: MEDICARE

## 2018-01-02 LAB
A/G RATIO (HISTORICAL): 1.4 (CALC) (ref 1–2.5)
ALBUMIN SERPL BCP-MCNC: 4 G/DL (ref 3.6–5.1)
ALP SERPL-CCNC: 90 U/L (ref 40–115)
ALT SERPL W P-5'-P-CCNC: 14 U/L (ref 9–46)
AST SERPL W P-5'-P-CCNC: 14 U/L (ref 10–35)
BILIRUB SERPL-MCNC: 0.6 MG/DL (ref 0.2–1.2)
BUN SERPL-MCNC: 14 MG/DL (ref 7–25)
BUN/CREA RATIO (HISTORICAL): ABNORMAL (CALC) (ref 6–22)
CALCIUM SERPL-MCNC: 9.1 MG/DL (ref 8.6–10.3)
CHLORIDE SERPL-SCNC: 99 MMOL/L (ref 98–110)
CHOLEST SERPL-MCNC: 128 MG/DL
CHOLEST/HDLC SERPL: 2.9 (CALC)
CO2 SERPL-SCNC: 30 MMOL/L (ref 20–31)
CREAT SERPL-MCNC: 0.77 MG/DL (ref 0.7–1.25)
EGFR AFRICAN AMERICAN (HISTORICAL): 107 ML/MIN/1.73M2
EGFR-AMERICAN CALC (HISTORICAL): 93 ML/MIN/1.73M2
GAMMA GLOBULIN (HISTORICAL): 2.9 G/DL (CALC) (ref 1.9–3.7)
GLUCOSE (HISTORICAL): 42 MG/DL (ref 65–99)
HBA1C MFR BLD HPLC: 7.4 % OF TOTAL HGB
HDLC SERPL-MCNC: 44 MG/DL
LDL CHOLESTEROL (HISTORICAL): 61 MG/DL (CALC)
NON-HDL-CHOL (CHOL-HDL) (HISTORICAL): 84 MG/DL (CALC)
POTASSIUM SERPL-SCNC: 3.6 MMOL/L (ref 3.5–5.3)
SODIUM SERPL-SCNC: 140 MMOL/L (ref 135–146)
TOTAL PROTEIN (HISTORICAL): 6.9 G/DL (ref 6.1–8.1)
TRIGL SERPL-MCNC: 142 MG/DL

## 2018-01-02 PROCEDURE — 77385 HB NTSTY MODUL RAD TX DLVR SMPL: CPT | Performed by: RADIOLOGY

## 2018-01-03 ENCOUNTER — GENERIC CONVERSION - ENCOUNTER (OUTPATIENT)
Dept: OTHER | Facility: OTHER | Age: 68
End: 2018-01-03

## 2018-01-03 PROCEDURE — 77385 HB NTSTY MODUL RAD TX DLVR SMPL: CPT | Performed by: RADIOLOGY

## 2018-01-04 PROCEDURE — 77417 THER RADIOLOGY PORT IMAGE(S): CPT | Performed by: RADIOLOGY

## 2018-01-04 PROCEDURE — 77336 RADIATION PHYSICS CONSULT: CPT | Performed by: RADIOLOGY

## 2018-01-04 PROCEDURE — 77385 HB NTSTY MODUL RAD TX DLVR SMPL: CPT | Performed by: RADIOLOGY

## 2018-01-05 PROCEDURE — 77385 HB NTSTY MODUL RAD TX DLVR SMPL: CPT | Performed by: RADIOLOGY

## 2018-01-08 PROCEDURE — 77385 HB NTSTY MODUL RAD TX DLVR SMPL: CPT | Performed by: RADIOLOGY

## 2018-01-09 ENCOUNTER — LAB CONVERSION - ENCOUNTER (OUTPATIENT)
Dept: OTHER | Facility: OTHER | Age: 68
End: 2018-01-09

## 2018-01-09 LAB
CREATININE, RANDOM URINE (HISTORICAL): 33 MG/DL (ref 20–370)
MAGNESIUM, UR (HISTORICAL): <0.2 MG/DL
MICROALBUMIN/CREATININE RATIO (HISTORICAL): NORMAL MCG/MG CREAT

## 2018-01-09 PROCEDURE — 77417 THER RADIOLOGY PORT IMAGE(S): CPT | Performed by: RADIOLOGY

## 2018-01-09 PROCEDURE — 77385 HB NTSTY MODUL RAD TX DLVR SMPL: CPT | Performed by: RADIOLOGY

## 2018-01-09 NOTE — PROGRESS NOTES
Plan    1  DSMT/MNT Time Record; Status:Complete;   Done: [id number] 02:34PM    Discussion/Summary    PATIENT EDUCATION RECORD   Indication for Services: hypertension, type 2 Diabetes Mellitus, hyperlipidemia and obesity  He is ready to learn  He has no barriers to learning  Healthy Eating:   Discussed importance of meal timing/consistency: Method: Instruction and Handout  Response: Verbalizes Understanding   Discussed nutrient types ( Cho/Fat/Protein): Method: Instruction and Handout  Response: Verbalizes Understanding   Discussed portion sizes: Method: Instruction and Handout  Response: Verbalizes Understanding   Discussed alcohol consumption: Method: Instruction  Response: Verbalizes Understanding   Discussed Eating Out: Method: Instruction  Response: Verbalizes Understanding   Discussed food label reading: Method: Instruction and Handout  Response: Verbalizes UnderstandingResponse: initial    Provided meal planning: Method: Instruction and Handout  Response: Verbalizes Understanding His current weight is 203 2  His keal needs are 1445  His CHO's per meal are 108 g/day (30% carb), 2,2,3  He/She was provided a meal plan for: fixed carbohydrates and weight loss  Discussed weight management/weight loss: Method: Instruction and Handout  Response: Verbalizes Understanding   His current BMI 34  Discussed basic carbohydrate counting: Method: Instruction and Handout  Response: Verbalizes Understanding   Being Active:   Stated the benefits of exercise: Method: Instruction  Response: Verbalizes Understanding   Taking Medication:   Discussed basal-bolus concept  Method: Instruction  Response: [facility]  Healthy Coping Class:   Identified lifestyle behaviors that need to change: Method: Instruction  Response: Verbalizes Understanding   Discussed motivation to change: Method: Instruction  Response: Verbalizes Understanding   Identified goals for behavior change: Method: Instruction  Response: Noris Understanding   Discussed strategies for change: Method: Instruction  Response: Verbalizes Understanding   He participated in goal setting  Will be available for follow up as needed He was given the following educational materials: portion book, Personal Meal Plan 1445 calories, Planning Healthy Meals and Calorie and Carbohydrate Tracking Books/Websites/Phone Apps   Chief Complaint  Patient is here today for Medical Nutrition Therapy for T2DM      History of Present Illness  Patient is a 75 y/o male with uncontrolled T2DM, Hyperlipidemia, HTN and Obesity  Patient is SMBG 4 x day  Readings mostly in the 200-300 range  From his food history he does not appear to be consuming excessive carbs  He is eating mostly processed foods since he does not cook  He has mechanical issues with his intake since he has no teeth  He was educated on meal planning and carb counting and given a low carb meal plan (30%)  Suggestions were given for quick easy meals and low carb snacks   Consistency in intake was stressed due to set bolus doses  This is his initial assessment    Present at session: patient  and Daughter in law    He has no special learning needs  His caloric needs are 1445  Recent weight change: +3    Patient  shops for food  Patient  cooks the food  Exercise routine:  None (COPD)   He eats breakfast at  7:30 AM Cheerios or Raisin Bran, almond milk, decaf coffee    He eats lunch at  12 PM Bologna sandwich on white bread, water  3 PM Fruit   He eats dinner at  5-6 PM Microwave meal: suresh steak dinner, 2 pc  bread   He snacks at at bedtime Popcorn OR tomatoes with salt   Medical Nutrition Therapy Intervention: Carbohydrate counting, Meal planning, Individualized meal plan, Strategies to increase the intake of plant based foods, Strategies to monitor portion control, Label reading, Meal timing, Behavior modification strategies and Weight/BMI Goals  His comprehension was good      His motivation was good     His compliance was good   Goals:  1  Follow meal plan/count carbs  2  Switch from processed meals to easy to prepare whole foods  3  Eat consistently due to set insulin doses      Active Problems    1  Diabetes mellitus type 2, uncontrolled (250 02) (E11 65)   2  Elevated PSA (790 93) (R97 2)   3  Hyperlipidemia (272 4) (E78 5)   4  Hypertension (401 9) (I10)   5  Obesity (278 00) (E66 9)    Past Medical History    1  History of Coronary artery disease (414 00) (I25 10)   2  History of cardiac disorder (V12 50) (Z86 79)    Surgical History    1  History of Cath Stent 1 Initial    Family History  Family History    1  No pertinent family history    Social History    · Daily caffeine consumption   · Never a smoker   · No drug use   · Occasional alcohol use    Current Meds   1  Aspirin 325 MG Oral Tablet; Therapy: (Recorded:21Jul2016) to Recorded   2  Atorvastatin Calcium 40 MG Oral Tablet; Therapy: (Recorded:21Jul2016) to Recorded   3  BD Pen Needle Pam U/F 32G X 4 MM Miscellaneous; USE 4 TIMES A DAY; Therapy: 86SMJ9579 to (Evaluate:23Mar2017)  Requested for: 28Mar2016; Last   Rx:28Mar2016 Ordered   4  Breo Ellipta 100-25 MCG/INH Inhalation Aerosol Powder Breath Activated; Therapy: (Recorded:02Mar2016) to Recorded   5  Effient 10 MG Oral Tablet; Therapy: (Recorded:21Jul2016) to Recorded   6  Famotidine 20 MG Oral Tablet; Therapy: (Recorded:21Jul2016) to Recorded   7  Flonase 50 MCG/ACT SUSP (Fluticasone Propionate); Therapy: (Recorded:21Jul2016) to Recorded   8  Hydrochlorothiazide 12 5 MG Oral Tablet; Therapy: (Recorded:02Mar2016) to Recorded   9  Lantus SoloStar 100 UNIT/ML Subcutaneous Solution Pen-injector; 45 units sq qhs; Therapy: (Recorded:10Aug2016) to  Requested for: 10Aug2016 Recorded   10  Lisinopril 2 5 MG Oral Tablet; Therapy: (Recorded:21Jul2016) to Recorded   11  Metoprolol Tartrate 100 MG Oral Tablet; Therapy: (Recorded:21Jul2016) to Recorded   12   NovoLOG FlexPen 100 UNIT/ML Subcutaneous Solution Pen-injector; 16 before    breakfast and lunch and 18 before supper; Therapy: 67UXT2473 to (Evaluate:20Qlw5909)  Requested for: 36Rth1150 Recorded   13  OneTouch Lancets Miscellaneous; USE TO TEST 4  A DAY; Therapy: 93BGV3761 to (James )  Requested for: 49Wgd2137; Last    Rx:05Apr2016 Ordered   14  OneTouch Ultra Blue In [employer]; check 4/day; Therapy: 67VJR0081 to (Jeanie Kat)  Requested for: 37TGZ9118; Last    Rx:02Mar2016 Ordered   15  Ventolin  (90 Base) MCG/ACT Inhalation Aerosol Solution; Therapy: (Recorded:02Mar2016) to Recorded    Allergies    1  No Known Drug Allergies    Vitals  Signs   Recorded: 79Qul9469 02:34PM   Weight: 203 lb 2 oz  BMI Calculated: 33 80  BSA Calculated: 1 99    Future Appointments    Date/Time Provider Specialty Site   10/27/2016 10:20 AM LAMBERTO Fitch   Endocrinology [facility] ENDOCRINOLOGY   09/30/2016 01:45 PM Kylie Diaz, [address] Urology [facility] FOR UROLOGY Remington Pisano     Signatures   Electronically signed by : Karli Andrews RD; Aug 24 2016  2:47PM EST                       (Author)    Electronically signed by : LAMBERTO Francois ; Aug 30 2016  8:43AM EST

## 2018-01-10 PROCEDURE — 77385 HB NTSTY MODUL RAD TX DLVR SMPL: CPT | Performed by: RADIOLOGY

## 2018-01-10 NOTE — PROGRESS NOTES
Plan    1  DSMT/MNT Time Record; Status:Complete;   Done: 45MIJ0862 11:49AM    Discussion/Summary    PATIENT EDUCATION RECORD   Indication for Services: hypertension, type 2 Diabetes Mellitus, hyperlipidemia and obesity  He is ready to learn  He has no barriers to learning  Healthy Eating:   Discussed importance of meal timing/consistency: Method: Instruction and Handout  Response: Verbalizes Understanding   Discussed nutrient types ( Cho/Fat/Protein): Method: Instruction and Handout  Response: Verbalizes Understanding   Discussed portion sizes: Method: Instruction and Handout  Response: Verbalizes Understanding   Discussed Eating Out: Method: Instruction  Response: Verbalizes Understanding   Discussed food label reading: Method: Instruction, Handout and Demonstration  Response: Verbalizes Understanding   Provided meal planning: Method: Instruction and Demonstration  Response: Verbalizes Understanding His keal needs are 1519  His CHO's per meal are 114 g/day 30% carb 3-3-3  He/She was provided a meal plan for: fixed carbohydrates and weight loss  Discussed weight management/weight loss: Method: Instruction  Response: Verbalizes Understanding   His current BMI 36  Discussed basic carbohydrate counting: Method: Instruction, Handout and Demonstration  Being Active:   Stated the benefits of exercise: Method: Instruction  Response: Verbalizes Understanding   Healthy Coping Class:   Identified lifestyle behaviors that need to change: Method: Instruction  Response: Verbalizes Understanding   Discussed motivation to change: Method: Instruction  Response: Verbalizes Understanding   Identified goals for behavior change: Method: Instruction  Response: Verbalizes Understanding   Discussed strategies for change: Method: Instruction  Response: Verbalizes Understanding   He participated in goal setting   Currently attending LW classes He was given the following educational materials: portion book, Personal Meal Plan 1090 calories, Planning Healthy Meals and Calorie and Carbohydrate Tracking Books/Websites/Phone Apps   Chief Complaint  Patient is here today for annual follow up Medical Nutrition Therapy for T2DM      History of Present Illness  Patient is a 70 y/o male with uncontrolled T2DM, HTN, HLP, COPD, Obese  A1c 8 8, BMI 36  Patient is testing his BG fasting, before meals and bedtime  Past 3 days fasting readings have risen to 200's  May be due to dietary indiscretions late at night when no bolus insulin is available  Reviewed basal/bolus insulin therapy with patient including action, peak and duration of insulins  Stressed need to avoid carbs in between meals when bolus insulin is no longer active  Food history shows a medium carb intake  Discussed alternatives to Leon drink and other high carb foods  Stressed consistency with set insulin doses and importance for facilitating dosing changes  Reviewed carb counting and meal planning and gave a low carb (30%) personal meal plan  This is his follow-up assessment, emely in [facility]    Present at session: patient    He has no special learning needs  His caloric needs are 1519  Recent weight change: +4    Patient  shops for food  Patient  and daughter in law  cooks the food  Exercise routine:  None but would like to start using recumbent bike   He eats breakfast at  AM 2 eggs, 1/2 c  mini wheats, almond milk, Jesse peanut butter crackers with black coffee   He snacks at AM [employer]   He eats lunch at  PM 2 pc  bread, olive loaf, 1 c  Leon   He snacks at [employer], [employer], Leon   He eats dinner at  [employer] and dumpling soup (120 g cho), water or Leon   He snacks at at bedtime [facility]     [address] [facility] and nutrition related knowledge deficit related to  lack of prior exposure to accurate nutrition related information    As evidenced by  no prior knowledge of need for food and nutrition related recommendations  Medical Nutrition Therapy Intervention: Carbohydrate counting, Meal planning, Individualized meal plan, Strategies to monitor portion control, Label reading, Meal timing, Behavior modification strategies and Weight/BMI Goals  His comprehension was good   His motivation was good   His compliance was good   Goals:  1  Follow meal plan/count carbs  2  Be consistent with carb intake to facilitate changes in bolus doses  3  Increase movement as able  4  Eliminate sweetened drinks and replace with non sweetened beverages      Active Problems    1  Diabetes mellitus type 2, uncontrolled (250 02) (E11 65)   2  Elevated PSA (790 93) (R97 20)   3  Hyperlipidemia (272 4) (E78 5)   4  Hypertension (401 9) (I10)   5  Insulin long-term use (V58 67) (Z79 4)   6  Obesity (278 00) (E66 9)    Past Medical History    1  History of Coronary artery disease (414 00) (I25 10)   2  History of cardiac disorder (V12 50) (Z86 79)    Surgical History    1  History of Cath Stent 1 Initial    Family History  Family History    1  No pertinent family history    Social History    · Daily caffeine consumption   · Never a smoker   · No drug use   · Occasional alcohol use    Current Meds   1  Aspirin 325 MG Oral Tablet; Therapy: (Recorded:15Fkt4437) to Recorded   2  Atorvastatin Calcium 40 MG Oral Tablet; Therapy: (Recorded:21Jul2016) to Recorded   3  BD Pen Needle Pam U/F 32G X 4 MM Miscellaneous; USE 4 TIMES A DAY; Therapy: 46VJI1075 to (Evaluate:84Oal7163)  Requested for: 15CGG1246; Last   Rx:13Mar2017 Ordered   4  Breo Ellipta 100-25 MCG/INH Inhalation Aerosol Powder Breath Activated; Therapy: (Recorded:02Mar2016) to Recorded   5  Effient 10 MG Oral Tablet; Therapy: (Recorded:77Fav4052) to Recorded   6  Famotidine 20 MG Oral Tablet; Therapy: (Recorded:21Jul2016) to Recorded   7  Flonase 50 MCG/ACT SUSP; Therapy: (Recorded:21Jul2016) to Recorded   8   HydroCHLOROthiazide 12 5 MG Oral Tablet; Therapy: (Recorded:02Mar2016) to Recorded   9  Lisinopril 2 5 MG Oral Tablet; Therapy: (Recorded:03Rui1770) to Recorded   10  Metoprolol Tartrate 100 MG Oral Tablet; Therapy: (Recorded:08Puw7212) to Recorded   11  NovoLOG FlexPen 100 UNIT/ML Subcutaneous Solution Pen-injector; 24 untis before    meals; Therapy: 68TYJ6841 to (Evaluate:65Aex8737)  Requested for: 28Mar2017; Last    Rx:28Mar2017 Ordered   12  OneTouch Lancets Miscellaneous; USE TO TEST 4  Times daily; Therapy: 05Apr2016 to (Linda Bruins)  Requested for: 75KVX4105; Last    Rx:03Apr2017 Ordered   13  OneTouch Ultra Blue In [employer]; check BS 4 times daily; Therapy: 59GGF8453 to (Linda Bruins)  Requested for: 04Apr2017; Last    Rx:03Apr2017 Ordered   14  Toujeo SoloStar 300 UNIT/ML Subcutaneous Solution Pen-injector; take 60 units qhs     Requested for: 55FQR6945; Last Rx:22Mar2017 Ordered   15  Ventolin  (90 Base) MCG/ACT Inhalation Aerosol Solution; Therapy: (Recorded:02Mar2016) to Recorded    Allergies    1  No Known Drug Allergies    Vitals  Signs   Recorded: 06SMM9590 11:51AM   Weight: 218 lb   BMI Calculated: 36 28  BSA Calculated: 2 05    Results/Data  DSMT/MNT Time Record 69FJJ8145 11:49AM Anny Pedroza     Test Name Result Flag Reference   Date of Service 6/15/17     Start - Stop Time 9:30-11     Total MInutes 90     Group Or Individual Instruction I-F/U RAVEN MNT       Future Appointments    Date/Time Provider Specialty Site   08/16/2017 09:30 AM LAMBERTO Fernández   Urology [facility] UROLOGY Jaimee Hand   06/28/2017 11:15 AM Anay Penn, [address]inology [address] [place] [address] ENDOCRINOLOGY     Signatures   Electronically signed by : Brigida Parker RD; Woody 15 2017  2:31PM EST                       (Author)    Electronically signed by : LAMBERTO Montiel ; Jun 20 2017  2:06PM EST

## 2018-01-10 NOTE — RESULT NOTES
Message   A1C high at 9 7 diabetes poorly controlled send BG log in two weeks rest of labs OK     Verified Results  (1) LIPID PANEL, FASTING 02Aug2016 07:07AM Sue Solomon Islander     Test Name Result Flag Reference   CHOLESTEROL, TOTAL 138 mg/dL  125-200   HDL CHOLESTEROL 38 mg/dL L > OR = 40   TRIGLICERIDES 185 mg/dL H <150   LDL-CHOLESTEROL 64 mg/dL (calc)  <130   Desirable range <100 mg/dL for patients with CHD or  diabetes and <70 mg/dL for diabetic patients with  known heart disease  CHOL/HDLC RATIO 3 6 (calc)  < OR = 5 0   NON HDL CHOLESTEROL 100 mg/dL (calc)     Target for non-HDL cholesterol is 30 mg/dL higher than   LDL cholesterol target  (Q) MICROALBUMIN, RANDOM URINE (W/CREATININE) 02Aug2016 07:07AM Sue [place] Islander     Test Name Result Flag Reference   CREATININE, RANDOM URINE 140 mg/dL     MICROALBUMIN 0 6 mg/dL     Reference Range  Not established   MICROALBUMIN/CREATININE$RATIO, RANDOM URINE 4 mcg/mg creat  <30   The ADA defines abnormalities in albumin  excretion as follows:     Category         Result (mcg/mg creatinine)     Normal                    <30  Microalbuminuria            Clinical albuminuria   > OR = 300     The ADA recommends that at least two of three  specimens collected within a 3-6 month period be  abnormal before considering a patient to be  within a diagnostic category  (1) COMPREHENSIVE METABOLIC PANEL [id number] 66:79EA Sue Solomon Islander     Test Name Result Flag Reference   GLUCOSE 164 mg/dL H 65-99   Fasting reference interval   UREA NITROGEN (BUN) 10 mg/dL  7-25   CREATININE 0 85 mg/dL  0 70-1 25   For patients >52years of age, the reference limit  for Creatinine is approximately 13% higher for people  identified as -American  eGFR NON-AFR   AMERICAN 90 mL/min/1 73m2  > OR = 60   eGFR AFRICAN AMERICAN 104 mL/min/1 73m2  > OR = 60   BUN/CREATININE RATIO   3-37   NOT APPLICABLE (calc)   SODIUM 138 mmol/L  135-146   POTASSIUM 4 6 mmol/L 3 5-5 3   CHLORIDE 96 mmol/L L    CARBON DIOXIDE 31 mmol/L  20-31   CALCIUM 9 5 mg/dL  8 6-10 3   PROTEIN, TOTAL 7 2 g/dL  6 1-8 1   ALBUMIN 4 0 g/dL  3 6-5 1   GLOBULIN 3 2 g/dL (calc)  1 9-3 7   ALBUMIN/GLOBULIN RATIO 1 3 (calc)  1 0-2 5   BILIRUBIN, TOTAL 0 7 mg/dL  0 2-1 2   ALKALINE PHOSPHATASE 85 U/L     AST 19 U/L  10-35   ALT 20 U/L  9-46     (1) T4, FREE 93Obc5619 07:07AM Gerlene Spanner     Test Name Result Flag Reference   T4, FREE 1 2 ng/dL  0 8-1 8     (Q) TSH, 3RD GENERATION 31Xsc6872 07:07AM Gerlene Spanner     Test Name Result Flag Reference   TSH 1 61 mIU/L  0 40-4 50     (Q) HEMOGLOBIN A1c 91Hkk4554 07:07AM Gerlene Spanner     Test Name Result Flag Reference   HEMOGLOBIN A1c 9 7 % of total Hgb H <5 7   According to ADA guidelines, hemoglobin A1c <7 0%  represents optimal control in non-pregnant diabetic  patients  Different metrics may apply to specific  patient populations  Standards of Medical Care in    Diabetes Care  2013;36:s11-s66     For the purpose of screening for the presence of  diabetes  <5 7%       Consistent with the absence of diabetes  5 7-6 4%    Consistent with increased risk for diabetes              (prediabetes)  >or=6 5%    Consistent with diabetes     This assay result is consistent with diabetes  mellitus  Currently, no consensus exists for use of hemoglobin  A1c for diagnosis of diabetes for children

## 2018-01-11 PROCEDURE — 77386 HB NTSTY MODUL RAD TX DLVR CPLX: CPT | Performed by: RADIOLOGY

## 2018-01-11 PROCEDURE — 77417 THER RADIOLOGY PORT IMAGE(S): CPT | Performed by: RADIOLOGY

## 2018-01-11 PROCEDURE — 77336 RADIATION PHYSICS CONSULT: CPT | Performed by: RADIOLOGY

## 2018-01-12 VITALS
HEIGHT: 65 IN | DIASTOLIC BLOOD PRESSURE: 68 MMHG | BODY MASS INDEX: 35.33 KG/M2 | HEART RATE: 68 BPM | WEIGHT: 212.06 LBS | SYSTOLIC BLOOD PRESSURE: 126 MMHG

## 2018-01-12 PROCEDURE — 77285 THER RAD SIMULAJ FIELD INTRM: CPT | Performed by: RADIOLOGY

## 2018-01-12 PROCEDURE — 77385 HB NTSTY MODUL RAD TX DLVR SMPL: CPT | Performed by: RADIOLOGY

## 2018-01-12 NOTE — CONSULTS
Assessment    1  Elevated PSA (790 93) (R97 2)    Plan  Elevated PSA    · (1) PSA, DIAGNOSTIC (FOLLOW-UP); Status:Active; Requested VIQ:90XGL9529;    Perform:[facility]; Due:12Vzg3229;Ordered;  For:Elevated PSA; Ordered By:David Ortiz;   · Urine Dip Non-Automated- POC; Status:Complete - Retrospective By Protocol  Authorization;   Done: 14RXW8619 10:31AM   Performed: In Office; FIS:[id number]; Last Updated Norma Taylor; 8/24/2016 10:29:10 AM;Ordered;  For:Elevated PSA; Ordered By:David Ortiz; Discussion/Summary  Discussion Summary:   66-year-old male with elevated PSA and abnormal BENJAMIN  We discussed prostate cancer screening and PSA at depth  We discussed that the only way to tell whether he has prostate cancer or not is with biopsy  We discussed the procedure of a prostate biopsy including the risks and benefits  We discussed that the patient is at a higher risk for infection given his uncontrolled diabetes  Also, unfortunately the patient is on blood thinners for his new cardiac stent  Because of this I am unsure whether it is safe enough to proceed with biopsy given the risks of bleeding  We'll contact his cardiologist to see if any thing can be done to take him off of anticoagulation  I tried to discuss with the patient and his son that we may need to just hold off on biopsy  We discussed that given his comorbidities even if he does have prostate cancer he is unlikely to die from that  For now I will set him up for follow-up in one month with a repeat PSA test       Chief Complaint  Chief Complaint Free Text Note Form: psa check  psa= 11 6(8/2/16)      History of Present Illness  HPI: 66-year-old male presents for evaluation of elevated PSA  His PSA was recently found to be 11 6  He denies any family history of prostate cancer  He does have some lower urinary tract symptoms including urgency and intermittency  He denies any gross hematuria   The patient has a lot of comorbidities including poorly controlled diabetes, COPD on 24 hour oxygen, and recent MI and stent placement in July  He is on Effient and aspirin  Review of Systems  Complete-Male Urology:   Constitutional: No fever or chills, feels well, no tiredness, no recent weight gain or weight loss  Respiratory: No complaints of shortness of breath, no wheezing, no cough, no SOB on exertion, no orthopnea or PND  Cardiovascular: No complaints of slow heart rate, no fast heart rate, no chest pain, no palpitations, no leg claudication, no lower extremity  Gastrointestinal: No complaints of abdominal pain, no constipation, no nausea or vomiting, no diarrhea or bloody stools  Genitourinary: Empty sensation, feelings of urinary urgency, stream quality good, urinary stream starts and stops, but no dysuria, no urinary hesitancy, no hematuria and no incontinence    The patient presents with complaints of nocturia (1 time)  Musculoskeletal: No complaints of arthralgia, no myalgias, no joint swelling or stiffness, no limb pain or swelling  Integumentary: No complaints of skin rash or skin lesions, no itching, no skin wound, no dry skin  Hematologic/Lymphatic: No complaints of swollen glands, no swollen glands in the neck, does not bleed easily, no easy bruising  Neurological: No compliants of headache, no confusion, no convulsions, no numbness or tingling, no dizziness or fainting, no limb weakness, no difficulty walking  ROS Reviewed:   ROS reviewed  Active Problems    1  Diabetes mellitus type 2, uncontrolled (250 02) (E11 65)   2  Elevated PSA (790 93) (R97 2)   3  Hyperlipidemia (272 4) (E78 5)   4  Hypertension (401 9) (I10)   5  Obesity (278 00) (E66 9)    Past Medical History    1  History of Coronary artery disease (414 00) (I25 10)   2  History of cardiac disorder (V12 50) (Z86 79)  Active Problems And Past Medical History Reviewed:    The active problems and past medical history were reviewed and updated today       Surgical History    1  History of Cath Stent 1 Initial  Surgical History Reviewed: The surgical history was reviewed and updated today  Family History  Family History    1  No pertinent family history  Family History Reviewed: The family history was reviewed and updated today  Social History    · Daily caffeine consumption   · Never a smoker   · No drug use   · Occasional alcohol use  Social History Reviewed: The social history was reviewed and updated today  Current Meds   1  Aspirin 325 MG Oral Tablet; Therapy: (Recorded:91Qze2197) to Recorded   2  Atorvastatin Calcium 40 MG Oral Tablet; Therapy: (Recorded:88Lpy5624) to Recorded   3  BD Pen Needle Pam U/F 32G X 4 MM Miscellaneous; USE 4 TIMES A DAY; Therapy: 72DVB3391 to (Evaluate:23Mar2017)  Requested for: 28Mar2016; Last   Rx:28Mar2016 Ordered   4  Breo Ellipta 100-25 MCG/INH Inhalation Aerosol Powder Breath Activated; Therapy: (Recorded:02Mar2016) to Recorded   5  Effient 10 MG Oral Tablet; Therapy: (Recorded:82Bqy3383) to Recorded   6  Famotidine 20 MG Oral Tablet; Therapy: (Recorded:26Oxl7266) to Recorded   7  Flonase 50 MCG/ACT SUSP; Therapy: (Recorded:39Zpi0765) to Recorded   8  Hydrochlorothiazide 12 5 MG Oral Tablet; Therapy: (Recorded:02Mar2016) to Recorded   9  Lantus SoloStar 100 UNIT/ML Subcutaneous Solution Pen-injector; 45 units sq qhs; Therapy: (Recorded:10Aug2016) to  Requested for: 10Aug2016 Recorded   10  Lisinopril 2 5 MG Oral Tablet; Therapy: (Recorded:95Moc5562) to Recorded   11  Metoprolol Tartrate 100 MG Oral Tablet; Therapy: (Recorded:43Blm3591) to Recorded   12  NovoLOG FlexPen 100 UNIT/ML Subcutaneous Solution Pen-injector; 16 before    breakfast and lunch and 18 before supper; Therapy: 18UFM9490 to (Evaluate:06Feb2017)  Requested for: 10Aug2016 Recorded   13  OneTouch Lancets Miscellaneous; USE TO TEST 4  A DAY;     Therapy: 05Apr2016 to (Evaluate:02Oct2016)  Requested for: 81NSR8187; Last    Rx:05Apr2016 Ordered   14  OneTouch Ultra Blue In [employer]; check 4/day; Therapy: 88WUR1756 to (Tl Hansen)  Requested for: 35YGL1979; Last    Rx:02Mar2016 Ordered   15  Ventolin  (90 Base) MCG/ACT Inhalation Aerosol Solution; Therapy: (Recorded:02Mar2016) to Recorded  Medication List Reviewed: The medication list was reviewed and updated today  Allergies    1  No Known Drug Allergies    Vitals  Vital Signs    Recorded: 78IWI3407 39:83HW   Systolic 952   Diastolic 70   Heart Rate 68   Height 5 ft 5 in   Weight 207 lb    BMI Calculated 34 45   BSA Calculated 2 01     Physical Exam    Constitutional   General appearance: No acute distress, well appearing and well nourished  Pulmonary   Respiratory effort: No increased work of breathing or signs of respiratory distress  Cardiovascular   Examination of extremities for edema and/or varicosities: Normal     Abdomen   Abdomen: Non-tender, no masses  Liver and spleen: No hepatomegaly or splenomegaly  Genitourinary   Anus and perineum: Normal     Scrotum contents: Normal size, no masses  Epididymis: Normal, no masses  Testes: Normal testes, no masses  Urethral meatus: Normal, no lesions  Penis: Normal, no lesions  Digital rectal exam of prostate: Abnormal   45 g, firm nodule towards the right  Nontender  Digital rectal exam of seminal vesicles: Normal size, no masses  Anus, perineum, and rectum: Normal     Musculoskeletal   Gait and station: Normal     Skin   Skin and subcutaneous tissue: Normal without rashes or lesions      Lymphatic   Palpation of lymph nodes in groin: Normal     Additional Exam:  Neuro exam nonfocal       Results/Data  Urine Dip Non-Automated- POC 67Jqs4012 10:31AM Parmjit Lema     Test Name Result Flag Reference   Color Yellow     Clarity Transparent     Leukocytes -     Nitrite -     Blood -     Protein -     Ph 5     Specific Gravity 1 020     Ketone -     Glucose - Future Appointments    Date/Time Provider Specialty Site   10/27/2016 10:20 AM LAMBERTO Crabtree   Endocrinology [facility] ENDOCRINOLOGY   08/24/2016 01:00 PM J Carlos Martinez RD Diabetes Educator [facility] S Carol Samson     Signatures   Electronically signed by : LAMBERTO Veras ; Aug 24 2016 11:11AM EST                       (Author)

## 2018-01-13 VITALS
WEIGHT: 208 LBS | SYSTOLIC BLOOD PRESSURE: 100 MMHG | DIASTOLIC BLOOD PRESSURE: 62 MMHG | TEMPERATURE: 98.2 F | BODY MASS INDEX: 34.66 KG/M2 | RESPIRATION RATE: 16 BRPM | HEART RATE: 70 BPM | HEIGHT: 65 IN | OXYGEN SATURATION: 91 %

## 2018-01-13 VITALS — WEIGHT: 218 LBS | BODY MASS INDEX: 36.28 KG/M2

## 2018-01-13 VITALS
HEART RATE: 76 BPM | DIASTOLIC BLOOD PRESSURE: 62 MMHG | HEIGHT: 65 IN | BODY MASS INDEX: 35.65 KG/M2 | SYSTOLIC BLOOD PRESSURE: 110 MMHG | WEIGHT: 214.01 LBS

## 2018-01-13 VITALS
SYSTOLIC BLOOD PRESSURE: 110 MMHG | HEIGHT: 65 IN | WEIGHT: 206 LBS | BODY MASS INDEX: 34.32 KG/M2 | HEART RATE: 64 BPM | DIASTOLIC BLOOD PRESSURE: 68 MMHG

## 2018-01-13 NOTE — PROGRESS NOTES
Plan    1  DSMT/MNT Time Record; Status:Complete;   Done: 11SVU9843 04:06PM    Discussion/Summary    PATIENT EDUCATION RECORD   Indication for Services: type 2 Diabetes Mellitus  He is ready to learn  He has no barriers to learning  Living Well with Diabetes Class 4 Education Content: Types of cholesterol, Dietary sources of cholesterol, Types of fat, Types of fiber, Reading food labels- in depth, Healthy choices when dining out        Active Problems    1  Diabetes mellitus type 2, uncontrolled (250 02) (E11 65)   2  Elevated PSA (790 93) (R97 20)   3  Hyperlipidemia (272 4) (E78 5)   4  Hypertension (401 9) (I10)   5  Insulin long-term use (V58 67) (Z79 4)   6  Obesity (278 00) (E66 9)    Past Medical History    1  History of Coronary artery disease (414 00) (I25 10)   2  History of cardiac disorder (V12 50) (Z86 79)    Surgical History    1  History of Cath Stent 1 Initial    Family History  Family History    1  No pertinent family history    Social History    · Daily caffeine consumption   · Never a smoker   · No drug use   · Occasional alcohol use    Current Meds   1  Aspirin 325 MG Oral Tablet; Therapy: (Recorded:21Jul2016) to Recorded   2  Atorvastatin Calcium 40 MG Oral Tablet; Therapy: (Recorded:21Jul2016) to Recorded   3  BD Pen Needle Pam U/F 32G X 4 MM Miscellaneous; USE 4 TIMES A DAY; Therapy: 24MHK9754 to (Evaluate:44Msv5818)  Requested for: 64QTP7819; Last   Rx:13Mar2017 Ordered   4  Breo Ellipta 100-25 MCG/INH Inhalation Aerosol Powder Breath Activated; Therapy: (Recorded:02Mar2016) to Recorded   5  Effient 10 MG Oral Tablet; Therapy: (Recorded:59Iri0394) to Recorded   6  Famotidine 20 MG Oral Tablet; Therapy: (Recorded:21Jul2016) to Recorded   7  Flonase 50 MCG/ACT SUSP (Fluticasone Propionate); Therapy: (Recorded:21Jul2016) to Recorded   8  HydroCHLOROthiazide 12 5 MG Oral Tablet; Therapy: (Recorded:02Mar2016) to Recorded   9  Lisinopril 2 5 MG Oral Tablet;    Therapy: (Recorded:47Wtm7578) to Recorded   10  Metoprolol Tartrate 100 MG Oral Tablet; Therapy: (Recorded:09Hmj8704) to Recorded   11  NovoLOG FlexPen 100 UNIT/ML Subcutaneous Solution Pen-injector; 24 untis before    meals; Therapy: 56BUF1401 to (Evaluate:57Bcw4299)  Requested for: 28Mar2017; Last    Rx:28Mar2017 Ordered   12  OneTouch Lancets Miscellaneous; USE TO TEST 4  Times daily; Therapy: 05Apr2016 [facility]  Requested for: 82IZG4403; Last    Rx:03Apr2017 Ordered   13  OneTouch Ultra Blue In [employer]; check BS 4 times daily; Therapy: 62SZL4237 to ([facility])  Requested for: 04Apr2017; Last    Rx:03Apr2017 Ordered   14  Toujeo SoloStar 300 UNIT/ML Subcutaneous Solution Pen-injector; take 60 units qhs     Requested for: 09QOJ2175; Last Rx:22Mar2017 Ordered   15  Ventolin  (90 Base) MCG/ACT Inhalation Aerosol Solution; Therapy: (Recorded:02Mar2016) to Recorded    Allergies    1  No Known Drug Allergies    End of Encounter Meds    1  BD Pen Needle Pam U/F 32G X 4 MM Miscellaneous; USE 4 TIMES A DAY; Therapy: 68ZNJ0408 to (Evaluate:03Xwh0016)  Requested for: 61GUP1173; Last   Rx:13Mar2017 Ordered   2  NovoLOG FlexPen 100 UNIT/ML Subcutaneous Solution Pen-injector; 24 untis before   meals; Therapy: 14BPZ5288 to (Evaluate:70Vvm5553)  Requested for: 28Mar2017; Last   Rx:28Mar2017 Ordered   3  OneTouch Lancets Miscellaneous; USE TO TEST 4  Times daily; Therapy: 05Apr2016 to ([facility])  Requested for: 62SHP0183; Last   Rx:03Apr2017 Ordered   4  OneTouch Ultra Blue In [employer]; check BS 4 times daily; Therapy: 66WMB4961 to ([facility])  Requested for: 04Apr2017; Last   Rx:03Apr2017 Ordered   5  Toujeo SoloStar 300 UNIT/ML Subcutaneous Solution Pen-injector; take 60 units qhs    Requested for: 90JZO2032; Last Rx:22Mar2017 Ordered    6  Aspirin 325 MG Oral Tablet; Therapy: (Recorded:58Jzn0854) to Recorded   7   Atorvastatin Calcium 40 MG Oral Tablet; Therapy: (Recorded:21Jul2016) to Recorded   8  Breo Ellipta 100-25 MCG/INH Inhalation Aerosol Powder Breath Activated; Therapy: (Recorded:02Mar2016) to Recorded   9  Effient 10 MG Oral Tablet; Therapy: (Recorded:21Jul2016) to Recorded   10  Famotidine 20 MG Oral Tablet; Therapy: (Recorded:21Jul2016) to Recorded   11  Flonase 50 MCG/ACT SUSP (Fluticasone Propionate); Therapy: (Recorded:21Jul2016) to Recorded   12  HydroCHLOROthiazide 12 5 MG Oral Tablet; Therapy: (Recorded:02Mar2016) to Recorded   13  Lisinopril 2 5 MG Oral Tablet; Therapy: (Recorded:21Jul2016) to Recorded   14  Metoprolol Tartrate 100 MG Oral Tablet; Therapy: (Recorded:21Jul2016) to Recorded   15  Ventolin  (90 Base) MCG/ACT Inhalation Aerosol Solution; Therapy: (Recorded:02Mar2016) to Recorded    Future Appointments    Date/Time Provider Specialty Site   06/27/2017 09:30 AM JULIANO Alfonso Diabetes Educator [facility] DIABETES EDUCATION   08/01/2017 09:30 AM JULIANO Alfonso Diabetes Educator [facility] DIABETES EDUCATION   08/16/2017 09:30 AM LAMBERTO German   Urology [facility] FOR UROLOGY [facility]   06/28/2017 11:15 AM Mona Penn, [address] Endocrinology [facility] ENDOCRINOLOGY     Signatures   Electronically signed by : Claudia Adams RD; Jun 20 2017  4:07PM EST                       (Author)    Electronically signed by : LAMBERTO Taveras ; Jun 21 2017  7:45AM EST

## 2018-01-13 NOTE — PROGRESS NOTES
Plan    1  DSMT/MNT Time Record; Status:Complete;   Done: 65XIB0047 05:58PM    Discussion/Summary    PATIENT EDUCATION RECORD   Indication for Services: type 2 Diabetes Mellitus  He is ready to learn  He has no barriers to learning  Diabetes Disease Process:   He understands the pathophysiology of diabetes: Method: Instruction and Handout  Response: Verbalizes Understanding   Discussed patient's type of diabetes: Method: Instruction  Response: Verbalizes Understanding   Healthy Eating:   Discussed general nutrition topics: Method: Instruction and Handout  Response: Verbalizes Understanding   Being Active:   Stated the benefits of exercise: Method: Instruction  Response: Verbalizes Understanding  Recommend he discuss a structured exercise program with his PCP/Cardiologist    His blood glucose targets are: Pre-meal target <130, Post-meal target ,180 and HS target 100 - 140  Monitoring:   Discussed target blood glucose ranges: Method: Instruction  Response: Verbalizes Understanding  Discussed target hemoglobin A1c: Method: Instruction  Response: Verbalizes Understanding  Discussed Finger stick testing: Method: Instruction  Response: Verbalizes Understanding  Discussed proper stick techniques: Method: Instruction  Response: Verbalizes Understanding  Discussed proper techniques, benefits, and precautions: Method: Instruction  Response: Verbalizes Understanding  Discussed testing times: Method: Instruction  Response: Verbalizes Understanding  Discussed safe lancet disposal: Method: Instruction  Response: Verbalizes Understanding  Discussed meter : Method: Instruction  Response: Verbalizes Understanding  Discussed options for record keeping: Method: Instruction  Response: Verbalizes Understanding  Discussed reporting of readings to M D : Method: Instruction  Response: Verbalizes Understanding  He was given Hypoglycemia Tear Sheet    Problem Solving: He is on medications that cause hypoglycemia, He is able to state the symptoms, prevention, and treatment of hypoglycemia   Hypoglycemia: Stated definition and causes: Method: Instruction and Handout  Described signs and symptoms: Method: Instruction and Handout  Discussed prevention: Method: Instruction and Handout  Discussed treatment: Method: Instruction and Handout  Hyperglycemia: Stated definition and causes: Method: Instruction and Handout  Response: Verbalizes Understanding   Described signs and symptoms: Method: Instruction and Handout  Response: Verbalizes Instruction   Education Plan/Path:  He needs the Living Well Class  He will be attending class at the [facility] in April  Chief Complaint  Appointment for class assessment/ general education  History of Present Illness  Current A1c is 8 8, this is an improvement from his last A1c  He is to change to Angelic Cummings but is finishing his Lantus and then change to the Angelic Cummings  He is taking a set dose of Novolog premeals  Topic discussed include glucose levels, paired testing, review of injection sites and injection technique  He is interested in improving his glucose control  He plans on attending diabetes classes  Current Meds   1  Aspirin 325 MG Oral Tablet; Therapy: (Recorded:21Jul2016) to Recorded   2  Atorvastatin Calcium 40 MG Oral Tablet; Therapy: (Recorded:21Jul2016) to Recorded   3  BD Pen Needle Pam U/F 32G X 4 MM Miscellaneous; USE 4 TIMES A DAY; Therapy: 23YMV6426 to (Evaluate:80Cun4238)  Requested for: 64HBU5271; Last   Rx:13Mar2017 Ordered   4  Breo Ellipta 100-25 MCG/INH Inhalation Aerosol Powder Breath Activated; Therapy: (Recorded:02Mar2016) to Recorded   5  Effient 10 MG Oral Tablet; Therapy: (Recorded:21Jul2016) to Recorded   6  Famotidine 20 MG Oral Tablet; Therapy: (Recorded:21Jul2016) to Recorded   7  Flonase 50 MCG/ACT SUSP; Therapy: (Recorded:21Jul2016) to Recorded   8  HydroCHLOROthiazide 12 5 MG Oral Tablet;    Therapy: (Recorded:02Mar2016) to Recorded   9  Lisinopril 2 5 MG Oral Tablet; Therapy: (Recorded:94Cym0208) to Recorded   10  Metoprolol Tartrate 100 MG Oral Tablet; Therapy: (Recorded:93Mbf1814) to Recorded   11  NovoLOG FlexPen 100 UNIT/ML Subcutaneous Solution Pen-injector; 24 untis before    meals; Therapy: 75JYT8506 to (Evaluate:24Sep2017)  Requested for: 28Mar2017; Last    Rx:28Mar2017 Ordered   12  OneTouch Lancets Miscellaneous; USE TO TEST 4  Times daily; Therapy: 05Apr2016 to ([facility])  Requested for: 62URI1211; Last    Rx:03Apr2017 Ordered   13  OneTouch Ultra Blue In [employer]; check BS 4 times daily; Therapy: 64ECC6235 to ([facility])  Requested for: 04Apr2017; Last    Rx:03Apr2017 Ordered   14  Toujeo SoloStar 300 UNIT/ML Subcutaneous Solution Pen-injector; take 60 units qhs     Requested for: 03THX2397; Last Rx:22Mar2017 Ordered   15  Ventolin  (90 Base) MCG/ACT Inhalation Aerosol Solution; Therapy: (Recorded:02Mar2016) to Recorded    Results/Data  DSMT/MNT Time Record 82JVX9330 05:58PM Rossana Siddiqi     Test Name Result Flag Reference   Date of Service 4/5/17     Start - Stop Time 11:15 am - 12:15     Total MInutes 60     Group Or Individual Instruction ind       End of Encounter Meds    1  BD Pen Needle Pam U/F 32G X 4 MM Miscellaneous; USE 4 TIMES A DAY; Therapy: 18XPN5731 to (Evaluate:09Sep2017)  Requested for: 55WIN9522; Last   Rx:13Mar2017 Ordered   2  NovoLOG FlexPen 100 UNIT/ML Subcutaneous Solution Pen-injector; 24 untis before   meals; Therapy: 32YVD4707 to (Evaluate:24Sep2017)  Requested for: 28Mar2017; Last   Rx:28Mar2017 Ordered   3  OneTouch Lancets Miscellaneous; USE TO TEST 4  Times daily; Therapy: 05Apr2016 to ([facility])  Requested for: 21BTL3226; Last   Rx:03Apr2017 Ordered   4  OneTouch Ultra Blue In [employer]; check BS 4 times daily;    Therapy: 24HIL3261 to (Konstantin Thomas)  Requested for: 66NSA9600; Last   Rx:03Apr2017 Ordered   5  Toujeo SoloStar 300 UNIT/ML Subcutaneous Solution Pen-injector; take 60 units qhs    Requested for: 49USR7076; Last Rx:22Mar2017 Ordered    6  Aspirin 325 MG Oral Tablet; Therapy: (Recorded:94Mnc3645) to Recorded   7  Atorvastatin Calcium 40 MG Oral Tablet; Therapy: (Recorded:29Tnw6758) to Recorded   8  Breo Ellipta 100-25 MCG/INH Inhalation Aerosol Powder Breath Activated; Therapy: (Recorded:02Mar2016) to Recorded   9  Effient 10 MG Oral Tablet; Therapy: (Recorded:54Dbg6022) to Recorded   10  Famotidine 20 MG Oral Tablet; Therapy: (Recorded:21Jul2016) to Recorded   11  Flonase 50 MCG/ACT SUSP (Fluticasone Propionate); Therapy: (Recorded:21Jul2016) to Recorded   12  HydroCHLOROthiazide 12 5 MG Oral Tablet; Therapy: (Recorded:02Mar2016) to Recorded   13  Lisinopril 2 5 MG Oral Tablet; Therapy: (Recorded:21Jul2016) to Recorded   14  Metoprolol Tartrate 100 MG Oral Tablet; Therapy: (Recorded:21Jul2016) to Recorded   15  Ventolin  (90 Base) MCG/ACT Inhalation Aerosol Solution; Therapy: (Recorded:02Mar2016) to Recorded    Future Appointments    Date/Time Provider Specialty Site   04[facility]11[facility]2017 09[facility]30 [facility] [address] DIABETES EDUCATION   [facility]2017 [facility] [address] DIABETES EDUCATION   04/18/2017 09:30 AM Joenathan Mortimer, CDE Diabetes Educator [facility] DIABETES EDUCATION   08/16/2017 09:30 AM LAMBERTO Singh  Urology [facility] CTR FOR UROLOGY [facility]   06/28/2017 11:15 AM Stan Penn, [address]  Endocrinology [address] ENDOCRINOLOGY     Signatures   Electronically signed by : JULIANO Garcia;  Apr 10 2017  9:04AM EST                       (Author)    Electronically signed by : LAMBERTO Garcia ; Apr 11 2017 12:58PM EST

## 2018-01-14 VITALS
HEIGHT: 65 IN | SYSTOLIC BLOOD PRESSURE: 110 MMHG | HEART RATE: 76 BPM | BODY MASS INDEX: 34.74 KG/M2 | DIASTOLIC BLOOD PRESSURE: 68 MMHG | WEIGHT: 208.5 LBS

## 2018-01-14 VITALS
SYSTOLIC BLOOD PRESSURE: 130 MMHG | WEIGHT: 209.5 LBS | HEART RATE: 62 BPM | BODY MASS INDEX: 34.91 KG/M2 | DIASTOLIC BLOOD PRESSURE: 70 MMHG | HEIGHT: 65 IN

## 2018-01-14 VITALS
WEIGHT: 214 LBS | HEART RATE: 76 BPM | DIASTOLIC BLOOD PRESSURE: 70 MMHG | BODY MASS INDEX: 35.65 KG/M2 | SYSTOLIC BLOOD PRESSURE: 102 MMHG | HEIGHT: 65 IN

## 2018-01-15 PROCEDURE — 77385 HB NTSTY MODUL RAD TX DLVR SMPL: CPT | Performed by: RADIOLOGY

## 2018-01-15 NOTE — RESULT NOTES
Verified Results  (1) COMPREHENSIVE METABOLIC PANEL 20SZB6084 06:71QS Maria Guadalupe Shoemaker     Test Name Result Flag Reference   GLUCOSE 134 mg/dL H 65-99   Fasting reference interval     For someone without known diabetes, a glucose  value >125 mg/dL indicates that they may have  diabetes and this should be confirmed with a  follow-up test    UREA NITROGEN (BUN) 15 mg/dL  7-25   CREATININE 0 80 mg/dL  0 70-1 25   For patients >52years of age, the reference limit  for Creatinine is approximately 13% higher for people  identified as -American  eGFR NON-AFR  AMERICAN 91 mL/min/1 73m2  > OR = 60   eGFR AFRICAN AMERICAN 106 mL/min/1 73m2  > OR = 60   BUN/CREATININE RATIO   7-95   NOT APPLICABLE (calc)   SODIUM 139 mmol/L  135-146   POTASSIUM 4 5 mmol/L  3 5-5 3   CHLORIDE 99 mmol/L     CARBON DIOXIDE 28 mmol/L  20-31   CALCIUM 9 4 mg/dL  8 6-10 3   PROTEIN, TOTAL 6 5 g/dL  6 1-8 1   ALBUMIN 3 7 g/dL  3 6-5 1   GLOBULIN 2 8 g/dL (calc)  1 9-3 7   ALBUMIN/GLOBULIN RATIO 1 3 (calc)  1 0-2 5   BILIRUBIN, TOTAL 0 5 mg/dL  0 2-1 2   ALKALINE PHOSPHATASE 94 U/L     AST 14 U/L  10-35   ALT 19 U/L  9-46     (Q) HEMOGLOBIN A1c 11BHJ9623 11:51AM Vaughn Penn   REPORT COMMENT:  FASTING:NO     Test Name Result Flag Reference   HEMOGLOBIN A1c 8 0 % of total Hgb H <5 7   For someone without known diabetes, a hemoglobin A1c  value of 6 5% or greater indicates that they may have   diabetes and this should be confirmed with a follow-up   test      For someone with known diabetes, a value <7% indicates   that their diabetes is well controlled and a value   greater than or equal to 7% indicates suboptimal   control  A1c targets should be individualized based on   duration of diabetes, age, comorbid conditions, and   other considerations  Currently, no consensus exists regarding use of  hemoglobin A1c for diagnosis of diabetes for children

## 2018-01-16 PROCEDURE — 77385 HB NTSTY MODUL RAD TX DLVR SMPL: CPT | Performed by: RADIOLOGY

## 2018-01-16 NOTE — PROGRESS NOTES
Discussion/Summary  Social Work-Discussion Summary [facility]: Patient is being seen for a distress screenning assessment  Received and reviewed Pt's Distress Thermometer completed by Pt on 11/8/17 in the [address] [place] [address] Floor Oncology office  Pt rated his distress at a 3/10 and identified transportation as a practical problem  Based upon Pt's low distress score, a Cancer Counselor follow up is not indicated  If Pt expresses psychosocial needs, Cancer Counselor is available to provide counseling and intervention         Signatures   Electronically signed by : Yonis Patterson, [address] [place]; Nov 10 2017 12:40PM EST                       (Author)

## 2018-01-17 PROCEDURE — 77385 HB NTSTY MODUL RAD TX DLVR SMPL: CPT | Performed by: RADIOLOGY

## 2018-01-17 NOTE — PROGRESS NOTES
Plan    1  DSMT/MNT Time Record; Status:Complete;   Done: [id number] 01:35PM    Discussion/Summary    PATIENT EDUCATION RECORD   Indication for Services: type 2 Diabetes Mellitus  He is ready to learn  He has no barriers to learning  Living Well with Diabetes Class 2 Education Content: Macronutrients, Carbohydrate sources, What one serving of carbohydrate equals, Effects of diet on blood glucose levels, effects of carbohydrates on blood glucose levels, Basics of meal planning: balance, portions, and meal times, Measurements, Reading food labels to determine carbohydrates       Active Problems    1  Diabetes mellitus type 2, uncontrolled (250 02) (E11 65)   2  Elevated PSA (790 93) (R97 20)   3  Hyperlipidemia (272 4) (E78 5)   4  Hypertension (401 9) (I10)   5  Insulin long-term use (V58 67) (Z79 4)   6  Obesity (278 00) (E66 9)    Past Medical History    1  History of Coronary artery disease (414 00) (I25 10)   2  History of cardiac disorder (V12 50) (Z86 79)    Surgical History    1  History of Cath Stent 1 Initial    Family History  Family History    1  No pertinent family history    Social History    · Daily caffeine consumption   · Never a smoker   · No drug use   · Occasional alcohol use    Current Meds   1  Aspirin 325 MG Oral Tablet; Therapy: (Recorded:21Jul2016) to Recorded   2  Atorvastatin Calcium 40 MG Oral Tablet; Therapy: (Recorded:21Jul2016) to Recorded   3  BD Pen Needle Pam U/F 32G X 4 MM Miscellaneous; USE 4 TIMES A DAY; Therapy: 64MNQ6701 to (Evaluate:95Tpa6669)  Requested for: 44HNL5208; Last   Rx:13Mar2017 Ordered   4  Breo Ellipta 100-25 MCG/INH Inhalation Aerosol Powder Breath Activated; Therapy: (Recorded:02Mar2016) to Recorded   5  Effient 10 MG Oral Tablet; Therapy: (Recorded:21Jul2016) to Recorded   6  Famotidine 20 MG Oral Tablet; Therapy: (Recorded:21Jul2016) to Recorded   7  Flonase 50 MCG/ACT SUSP (Fluticasone Propionate);    Therapy: (Recorded:21Jul2016) to Recorded 8  HydroCHLOROthiazide 12 5 MG Oral Tablet; Therapy: (Recorded:02Mar2016) to Recorded   9  Lisinopril 2 5 MG Oral Tablet; Therapy: (Recorded:51Yih1438) to Recorded   10  Metoprolol Tartrate 100 MG Oral Tablet; Therapy: (Recorded:91Nxr5631) to Recorded   11  NovoLOG FlexPen 100 UNIT/ML Subcutaneous Solution Pen-injector; 24 untis before    meals; Therapy: 85FZW7309 to (Evaluate:24Sep2017)  Requested for: 28Mar2017; Last    Rx:28Mar2017 Ordered   12  OneTouch Lancets Miscellaneous; USE TO TEST 4  Times daily; Therapy: 05Apr2016 to (Matias Sella)  Requested for: 23JOF4346; Last    Rx:03Apr2017 Ordered   13  OneTouch Ultra Blue In [employer]; check BS 4 times daily; Therapy: 67QJZ3424 to (Matias Sella)  Requested for: 04Apr2017; Last    Rx:03Apr2017 Ordered   14  Toujeo SoloStar 300 UNIT/ML Subcutaneous Solution Pen-injector; take 60 units qhs     Requested for: 60OHJ0328; Last Rx:22Mar2017 Ordered   15  Ventolin  (90 Base) MCG/ACT Inhalation Aerosol Solution; Therapy: (Recorded:02Mar2016) to Recorded    Allergies    1  No Known Drug Allergies    End of Encounter Meds    1  BD Pen Needle Pam U/F 32G X 4 MM Miscellaneous; USE 4 TIMES A DAY; Therapy: 25LHF7839 to (Evaluate:09Sep2017)  Requested for: 77RHF2708; Last   Rx:13Mar2017 Ordered   2  NovoLOG FlexPen 100 UNIT/ML Subcutaneous Solution Pen-injector; 24 untis before   meals; Therapy: 88DSO4250 to (Evaluate:47Eiz9638)  Requested for: 28Mar2017; Last   Rx:28Mar2017 Ordered   3  OneTouch Lancets Miscellaneous; USE TO TEST 4  Times daily; Therapy: 05Apr2016 to (Matias Sella)  Requested for: 04EZD0791; Last   Rx:03Apr2017 Ordered   4  OneTouch Ultra Blue In [employer]; check BS 4 times daily; Therapy: 26YPR2110 to (Matias Sella)  Requested for: 04Apr2017; Last   Rx:03Apr2017 Ordered   5   Toujeo SoloStar 300 UNIT/ML Subcutaneous Solution Pen-injector; take 60 units qhs    Requested for: 77EVA1200; Last Rx:22Mar2017 Ordered    6  Aspirin 325 MG Oral Tablet; Therapy: (Recorded:47Voi0217) to Recorded   7  Atorvastatin Calcium 40 MG Oral Tablet; Therapy: (Recorded:76Bor6798) to Recorded   8  Breo Ellipta 100-25 MCG/INH Inhalation Aerosol Powder Breath Activated; Therapy: (Recorded:02Mar2016) to Recorded   9  Effient 10 MG Oral Tablet; Therapy: (Recorded:22Zyk3565) to Recorded   10  Famotidine 20 MG Oral Tablet; Therapy: (Recorded:01Rjw2635) to Recorded   11  Flonase 50 MCG/ACT SUSP (Fluticasone Propionate); Therapy: (Recorded:01Lxl1992) to Recorded   12  HydroCHLOROthiazide 12 5 MG Oral Tablet; Therapy: (Recorded:02Mar2016) to Recorded   13  Lisinopril 2 5 MG Oral Tablet; Therapy: (Recorded:08Tgl4755) to Recorded   14  Metoprolol Tartrate 100 MG Oral Tablet; Therapy: (Recorded:47Xgf0992) to Recorded   15  Ventolin  (90 Base) MCG/ACT Inhalation Aerosol Solution; Therapy: (Recorded:02Mar2016) to Recorded    Future Appointments    Date/Time Provider Specialty Site   06/15/2017 09:30 AM Nilesh Ochoa, [address] Diabetes Educator [facility] ENDOCRINOLOGY Stephanie Letha [facility]   08/16/2017 09:30 AM LAMBERTO Gamez   Urology [facility] UROLOG[facility]   06/28/2017 11:15 AM Phi Penn, [address] [facility] ENDOCRINOLOGY     Signatures   Electronically signed by : Lisa Rascon RD; Jun 13 2017  1:36PM EST                       (Author)    Electronically signed by : LAMBERTO Vargas ; Jun 20 2017  2:06PM EST

## 2018-01-17 NOTE — RESULT NOTES
Verified Results  (1) LIPID PANEL, FASTING 43Cnm9255 08:54AM Emely Penn     Test Name Result Flag Reference   CHOLESTEROL, TOTAL 197 mg/dL  <200   HDL CHOLESTEROL 43 mg/dL  >42   TRIGLICERIDES 881 mg/dL  <150   LDL-CHOLESTEROL 129 mg/dL (calc) H    Reference range: <100     Desirable range <100 mg/dL for patients with CHD or  diabetes and <70 mg/dL for diabetic patients with  known heart disease  LDL-C is now calculated using the Bennie-Lacey   calculation, which is a validated novel method providing   better accuracy than the Friedewald equation in the   estimation of LDL-C  Shoaib Petersen  Raeann Jade  0426;023(59): 9476-7700   (http://Innobits/faq/GJE337)   CHOL/HDLC RATIO 4 6 (calc)  <5 0   NON HDL CHOLESTEROL 154 mg/dL (calc) H <130   For patients with diabetes plus 1 major ASCVD risk   factor, treating to a non-HDL-C goal of <100 mg/dL   (LDL-C of <70 mg/dL) is considered a therapeutic   option  (1) COMPREHENSIVE METABOLIC PANEL 84CHJ2541 24:40IE Emely Penn     Test Name Result Flag Reference   GLUCOSE 43 mg/dL L 65-99   Fasting reference interval   UREA NITROGEN (BUN) 11 mg/dL  7-25   CREATININE 0 73 mg/dL  0 70-1 25   For patients >52years of age, the reference limit  for Creatinine is approximately 13% higher for people  identified as -American  eGFR NON-AFR   AMERICAN 95 mL/min/1 73m2  > OR = 60   eGFR AFRICAN AMERICAN 110 mL/min/1 73m2  > OR = 60   BUN/CREATININE RATIO   7-34   NOT APPLICABLE (calc)   SODIUM 140 mmol/L  135-146   POTASSIUM 3 9 mmol/L  3 5-5 3   CHLORIDE 102 mmol/L     CARBON DIOXIDE 26 mmol/L  20-31   CALCIUM 9 3 mg/dL  8 6-10 3   PROTEIN, TOTAL 6 5 g/dL  6 1-8 1   ALBUMIN 3 9 g/dL  3 6-5 1   GLOBULIN 2 6 g/dL (calc)  1 9-3 7   ALBUMIN/GLOBULIN RATIO 1 5 (calc)  1 0-2 5   BILIRUBIN, TOTAL 0 5 mg/dL  0 2-1 2   ALKALINE PHOSPHATASE 93 U/L     AST 15 U/L  10-35   ALT 20 U/L  9-46     (Q) MICROALBUMIN, RANDOM URINE (W/CREATININE) [id number] 08:54AM Julien Penn     Test Name Result Flag Reference   CREATININE, RANDOM URINE 167 mg/dL     MICROALBUMIN 3 6 mg/dL     Reference Range  Not established   MICROALBUMIN/CREATININE$RATIO, RANDOM URINE 22 mcg/mg creat  <30   The ADA defines abnormalities in albumin  excretion as follows:     Category         Result (mcg/mg creatinine)     Normal                    <30  Microalbuminuria            Clinical albuminuria   > OR = 300     The ADA recommends that at least two of three  specimens collected within a 3-6 month period be  abnormal before considering a patient to be  within a diagnostic category  (Q) HEMOGLOBIN A1c 79Ikv1230 08:54AM Vaughn Penn   REPORT COMMENT:  FASTING:YES     Test Name Result Flag Reference   HEMOGLOBIN A1c 7 2 % of total Hgb H <5 7   For someone without known diabetes, a hemoglobin A1c  value of 6 5% or greater indicates that they may have   diabetes and this should be confirmed with a follow-up   test      For someone with known diabetes, a value <7% indicates   that their diabetes is well controlled and a value   greater than or equal to 7% indicates suboptimal   control  A1c targets should be individualized based on   duration of diabetes, age, comorbid conditions, and   other considerations  Currently, no consensus exists regarding use of  hemoglobin A1c for diagnosis of diabetes for children

## 2018-01-17 NOTE — MISCELLANEOUS
Provider Comments  Provider Comments:   PATIENT NO SHOWED FOR 10- APPT  Signatures   Electronically signed by :  Jorge Flaherty; Oct 27 2016  9:01AM EST                       (Author)

## 2018-01-18 PROCEDURE — 77385 HB NTSTY MODUL RAD TX DLVR SMPL: CPT | Performed by: RADIOLOGY

## 2018-01-18 PROCEDURE — 77417 THER RADIOLOGY PORT IMAGE(S): CPT | Performed by: RADIOLOGY

## 2018-01-18 PROCEDURE — 77336 RADIATION PHYSICS CONSULT: CPT | Performed by: RADIOLOGY

## 2018-01-19 PROCEDURE — 77385 HB NTSTY MODUL RAD TX DLVR SMPL: CPT | Performed by: RADIOLOGY

## 2018-01-22 VITALS
HEART RATE: 80 BPM | HEIGHT: 65 IN | WEIGHT: 209.25 LBS | BODY MASS INDEX: 34.86 KG/M2 | SYSTOLIC BLOOD PRESSURE: 118 MMHG | DIASTOLIC BLOOD PRESSURE: 64 MMHG

## 2018-01-22 VITALS
WEIGHT: 208 LBS | BODY MASS INDEX: 34.66 KG/M2 | DIASTOLIC BLOOD PRESSURE: 82 MMHG | SYSTOLIC BLOOD PRESSURE: 130 MMHG | HEIGHT: 65 IN

## 2018-01-22 VITALS
DIASTOLIC BLOOD PRESSURE: 78 MMHG | BODY MASS INDEX: 34.99 KG/M2 | WEIGHT: 210 LBS | SYSTOLIC BLOOD PRESSURE: 124 MMHG | HEIGHT: 65 IN | HEART RATE: 60 BPM

## 2018-01-22 PROCEDURE — 77385 HB NTSTY MODUL RAD TX DLVR SMPL: CPT | Performed by: RADIOLOGY

## 2018-01-23 PROCEDURE — 77385 HB NTSTY MODUL RAD TX DLVR SMPL: CPT | Performed by: RADIOLOGY

## 2018-01-23 NOTE — RESULT NOTES
Discussion/Summary   A1c slightly higher at 7 4 however sugar was low - 42  is having hypoglycemic episodes? Please bring in fingerstick log to next visit or call us sooner if he is having frequent hypoglycemia     Verified Results  (1) LIPID PANEL, FASTING [id number] 09:47AM [employer]     Test Name Result Flag Reference   CHOLESTEROL, TOTAL 128 mg/dL  <200   HDL CHOLESTEROL 44 mg/dL  >30   TRIGLICERIDES 388 mg/dL  <150   LDL-CHOLESTEROL 61 mg/dL (calc)     Reference range: <100     Desirable range <100 mg/dL for patients with CHD or  diabetes and <70 mg/dL for diabetic patients with  known heart disease  LDL-C is now calculated using the Bennie-Lacey   calculation, which is a validated novel method providing   better accuracy than the Friedewald equation in the   estimation of LDL-C  Norma Gramajo  [facility]3;773(04): 5170-5782   (http://Travelmenu/faq/JCD693)   CHOL/HDLC RATIO 2 9 (calc)  <5 0   NON HDL CHOLESTEROL 84 mg/dL (calc)  <130   For patients with diabetes plus 1 major ASCVD risk   factor, treating to a non-HDL-C goal of <100 mg/dL   (LDL-C of <70 mg/dL) is considered a therapeutic   option  (1) COMPREHENSIVE METABOLIC PANEL [id number] 83:15IF [employer]     Test Name Result Flag Reference   GLUCOSE 42 mg/dL L 65-99   Fasting reference interval   UREA NITROGEN (BUN) 14 mg/dL  7-25   CREATININE 0 77 mg/dL  0 70-1 25   For patients >52years of age, the reference limit  for Creatinine is approximately 13% higher for people  identified as -American  eGFR NON-AFR   AMERICAN 93 mL/min/1 73m2  > OR = 60   eGFR AFRICAN AMERICAN 107 mL/min/1 73m2  > OR = 60   BUN/CREATININE RATIO   6-62   NOT APPLICABLE (calc)   SODIUM 140 mmol/L  135-146   POTASSIUM 3 6 mmol/L  3 5-5 3   CHLORIDE 99 mmol/L     CARBON DIOXIDE 30 mmol/L  20-31   CALCIUM 9 1 mg/dL  8 6-10 3   PROTEIN, TOTAL 6 9 g/dL  6 1-8 1   ALBUMIN 4 0 g/dL  3 6-5 1   GLOBULIN 2 9 g/dL (calc)  1 9-3 7 ALBUMIN/GLOBULIN RATIO 1 4 (calc)  1 0-2 5   BILIRUBIN, TOTAL 0 6 mg/dL  0 2-1 2   ALKALINE PHOSPHATASE 90 U/L     AST 14 U/L  10-35   ALT 14 U/L  9-46     (Q) HEMOGLOBIN A1c [id number] 09:47AM Calvin Xiao   REPORT COMMENT:  FASTING:YES  PATIENT UNABLE TO VOID; ADVISED TO RETURN FOR COLLECTION  Test Name Result Flag Reference   HEMOGLOBIN A1c 7 4 % of total Hgb H <5 7   For someone without known diabetes, a hemoglobin A1c  value of 6 5% or greater indicates that they may have   diabetes and this should be confirmed with a follow-up   test      For someone with known diabetes, a value <7% indicates   that their diabetes is well controlled and a value   greater than or equal to 7% indicates suboptimal   control  A1c targets should be individualized based on   duration of diabetes, age, comorbid conditions, and   other considerations  Currently, no consensus exists regarding use of  hemoglobin A1c for diagnosis of diabetes for children

## 2018-01-24 DIAGNOSIS — E11.8 TYPE 2 DIABETES MELLITUS WITH COMPLICATION, WITH LONG-TERM CURRENT USE OF INSULIN (HCC): Primary | ICD-10-CM

## 2018-01-24 DIAGNOSIS — Z79.4 TYPE 2 DIABETES MELLITUS WITH COMPLICATION, WITH LONG-TERM CURRENT USE OF INSULIN (HCC): Primary | ICD-10-CM

## 2018-01-24 PROCEDURE — 77385 HB NTSTY MODUL RAD TX DLVR SMPL: CPT | Performed by: RADIOLOGY

## 2018-01-24 PROCEDURE — 77336 RADIATION PHYSICS CONSULT: CPT | Performed by: RADIOLOGY

## 2018-01-24 RX ORDER — HYDROCHLOROTHIAZIDE 12.5 MG/1
1 TABLET ORAL DAILY
COMMUNITY

## 2018-01-24 RX ORDER — BLOOD-GLUCOSE METER
EACH MISCELLANEOUS
COMMUNITY
Start: 2017-10-11

## 2018-01-24 RX ORDER — LISINOPRIL 2.5 MG/1
1 TABLET ORAL DAILY
COMMUNITY

## 2018-01-24 RX ORDER — BICALUTAMIDE 50 MG/1
1 TABLET, FILM COATED ORAL DAILY
COMMUNITY
Start: 2017-10-18

## 2018-01-24 RX ORDER — METOPROLOL TARTRATE 100 MG/1
1 TABLET ORAL 2 TIMES DAILY
COMMUNITY
End: 2020-03-24 | Stop reason: DRUGHIGH

## 2018-01-24 RX ORDER — FAMOTIDINE 20 MG/1
1 TABLET, FILM COATED ORAL 2 TIMES DAILY
COMMUNITY

## 2018-01-24 RX ORDER — ATORVASTATIN CALCIUM 40 MG/1
1 TABLET, FILM COATED ORAL DAILY
COMMUNITY

## 2018-01-24 RX ORDER — CIPROFLOXACIN 500 MG/1
TABLET, FILM COATED ORAL
COMMUNITY
Start: 2017-10-27 | End: 2018-06-19

## 2018-01-24 RX ORDER — ASPIRIN 325 MG
1 TABLET ORAL DAILY
COMMUNITY

## 2018-01-24 RX ORDER — PRASUGREL 10 MG/1
1 TABLET, FILM COATED ORAL DAILY
COMMUNITY

## 2018-01-24 RX ORDER — FLUTICASONE FUROATE AND VILANTEROL 100; 25 UG/1; UG/1
POWDER RESPIRATORY (INHALATION) DAILY
COMMUNITY
End: 2018-08-28 | Stop reason: CLARIF

## 2018-01-25 ENCOUNTER — TRANSCRIBE ORDERS (OUTPATIENT)
Dept: ADMINISTRATIVE | Facility: HOSPITAL | Age: 68
End: 2018-01-25

## 2018-01-25 DIAGNOSIS — J43.9 EMPHYSEMATOUS BLEB (HCC): Primary | ICD-10-CM

## 2018-01-26 DIAGNOSIS — E11.65 TYPE 2 DIABETES MELLITUS WITH HYPERGLYCEMIA, UNSPECIFIED LONG TERM INSULIN USE STATUS: Primary | ICD-10-CM

## 2018-01-26 DIAGNOSIS — E11.65 TYPE 2 DIABETES MELLITUS WITH HYPERGLYCEMIA, UNSPECIFIED LONG TERM INSULIN USE STATUS: ICD-10-CM

## 2018-01-29 ENCOUNTER — HOSPITAL ENCOUNTER (OUTPATIENT)
Dept: RADIOLOGY | Facility: MEDICAL CENTER | Age: 68
Discharge: HOME | End: 2018-01-29
Payer: MEDICARE

## 2018-01-29 DIAGNOSIS — J43.9 EMPHYSEMATOUS BLEB (HCC): ICD-10-CM

## 2018-01-29 PROCEDURE — 71275 CT ANGIOGRAPHY CHEST: CPT

## 2018-01-29 RX ADMIN — IOHEXOL 85 ML: 350 INJECTION, SOLUTION INTRAVENOUS at 09:47

## 2018-02-05 ENCOUNTER — TELEPHONE (OUTPATIENT)
Dept: ENDOCRINOLOGY | Facility: CLINIC | Age: 68
End: 2018-02-05

## 2018-02-06 DIAGNOSIS — Z79.4 ENCOUNTER FOR LONG-TERM (CURRENT) USE OF INSULIN (HCC): Primary | ICD-10-CM

## 2018-02-14 PROBLEM — C61 PROSTATE CANCER (HCC): Status: ACTIVE | Noted: 2018-02-14

## 2018-02-20 ENCOUNTER — RADIATION ONCOLOGY FOLLOW-UP (OUTPATIENT)
Dept: RADIATION ONCOLOGY | Facility: HOSPITAL | Age: 68
End: 2018-02-20

## 2018-02-20 ENCOUNTER — APPOINTMENT (OUTPATIENT)
Dept: RADIATION ONCOLOGY | Facility: HOSPITAL | Age: 68
End: 2018-02-20
Payer: MEDICARE

## 2018-02-20 VITALS
HEIGHT: 65 IN | WEIGHT: 218.4 LBS | HEART RATE: 72 BPM | OXYGEN SATURATION: 83 % | BODY MASS INDEX: 36.39 KG/M2 | RESPIRATION RATE: 22 BRPM | SYSTOLIC BLOOD PRESSURE: 121 MMHG | TEMPERATURE: 97.7 F | DIASTOLIC BLOOD PRESSURE: 59 MMHG

## 2018-02-20 PROCEDURE — 99214 OFFICE O/P EST MOD 30 MIN: CPT | Performed by: RADIOLOGY

## 2018-02-20 NOTE — PROGRESS NOTES
Darleen Mackey  is a 69 y o  male     Oncology History    Patient presents for first follow-up  For RT for prostate cancer completed on 1/24/18     69year old male initially seen by Dr Alma Carter in August 2016 for elevated PSA of 11 6  No family history of prostate cancer  The patient has history of multiple comorbidities including uncontrolled type 2 diabetes , COPD, uses continuous oxygen, MI and stent placement  Repeat PSA in September 2016 increased to 13 0  At that time, the patient was on anticoagulation therapy s/p MI and stent placement, the prostate biopsy was not able to be performed  Repeat PSA 's continued to rise, 14 2 on 2/13/17 and 17 6 on 8/2/17 9/14/17 prostate biopsy sided needle biopsy: [place] 8 (4+4) in 3 of 3 cores involving 100% of core tissue, perineural invasion presentsided needle core biopsy: Reggie 7 (4+3) in 1 of 3 cores involving 5% of core tissue  Patient finished radiation therapy course with IMRT for [place] 8 adenocarcinoma of the prostate, PSA less than 20  RT was completed on 1/24/18 5/2/18- scheduled f/u with Dr Alma Carter  Prostate cancer ([facility] [address] )    9/14/2017 Initial Diagnosis     Prostate cancer ([facility] [address] )         9/14/2017 Biopsy     Milford score 8         11/21/2017 - 1/24/2018 Radiation     Treatments:  Course: C1    Plan ID Energy Fractions Dose per Fraction (cGy) Total Dose Delivered (cGy) Elapsed Days   Prostate_SV 6X 44 / 44 180 7,920 64      Treatment dates:  11/21/2017 - 1/24/2018              Interval History  Patient presents for first follow-up  For RT for prostate cancer completed on 1/24/18     69year old male initially seen by Dr Alma Carter in August 2016 for elevated PSA of 11 6  No family history of prostate cancer  The patient has history of multiple comorbidities including uncontrolled type 2 diabetes , COPD, uses continuous oxygen, MI and stent placement  Repeat PSA in September 2016 increased to 13 0   At that time, the patient was on anticoagulation therapy s/p MI and stent placement, the prostate biopsy was not able to be performed  Repeat PSA 's continued to rise, 14 2 on 2/13/17 and 17 6 on 8/2/17 9/14/17 prostate biopsy sided needle biopsy: [place] 8 (4+4) in 3 of 3 cores involving 100% of core tissue, perineural invasion presentsided needle core biopsy: [place] 7 (4+3) in 1 of 3 cores involving 5% of core tissue  Patient finished radiation therapy course with IMRT for [place] 8 adenocarcinoma of the prostate, PSA less than 20  RT was completed on 1/24/18 5/2/18- scheduled f/u with Dr Mine Mancera  Today he arrived, ambulated in the halls to the exam room, initially his pulse ox was difficult to obtain  After sitting a few mins  His O2 sat was 80%  On the 4 liters he came here using  Increase Oxygen to 5 liters/min, he eventually did get up to 91% sitting quietly  Whenever he spoke the O2 sat immediately decreased to 85- 86%, and once was as low as 83%  He reports he does not test O2 sat  at home  He stated he does feel sob, when he exerts himself  Paging Dr Silvano Alvarenga, to report decreased Oxygen levels  I have spoken with DR Ema Moreno 4580, he is aware of the pt  's low O2 sats, and he states  " that's where he lives" and has been at that for along time "  Unfortunately that's as good as he gets  " pt stated he  Recently has had medication changes, not much change noted  Patient Active Problem List   Diagnosis    Prostate cancer [facility])     Past Medical History:   Diagnosis Date    COPD [facility]chronic obstructive pulmonary disease) ([facility] [address] )     Diabetes mellitus ([facility] [address] )     Malignant neoplasm of prostate ([facility] [address] )      Past Surgical History:   Procedure Laterality Date    CARDIAC SURGERY       History reviewed  No pertinent family history  Social History     Social History    Marital status: Single     Spouse name: N/A    Number of children: N/A    Years of education: N/A     Occupational History    Not on file  Social History Main Topics    Smoking status: Former Smoker    Smokeless tobacco: Never Used    Alcohol use No    Drug use: No    Sexual activity: Not on file     Other Topics Concern    Not on file     Social History Narrative    No narrative on file       Current Outpatient Prescriptions:     albuterol (VENTOLIN HFA) 90 mcg/act inhaler, Inhale, Disp: , Rfl:     aspirin 325 mg tablet, Take 1 tablet by mouth daily, Disp: , Rfl:     atorvastatin (LIPITOR) 40 mg tablet, Take 1 tablet by mouth daily, Disp: , Rfl:     bicalutamide (CASODEX) 50 mg tablet, Take 1 tablet by mouth Daily, Disp: , Rfl:     Blood Glucose Monitoring Suppl (ONE TOUCH ULTRA 2) w/Device KIT, by Does not apply route, Disp: , Rfl:     famotidine (PEPCID) 20 mg tablet, Take 1 tablet by mouth 2 (two) times a day, Disp: , Rfl:     fluticasone furoate-vilanterol (BREO ELLIPTA), Inhale daily, Disp: , Rfl:     glucose blood (ONE TOUCH ULTRA TEST) test strip, Use as instructed, Disp: 100 each, Rfl: 0    glucose blood (ONE TOUCH ULTRA TEST) test strip, TEST 4 TIMES DAILY E11 65, Disp: 400 each, Rfl: 3    hydrochlorothiazide (HYDRODIURIL) 12 5 mg tablet, Take 1 tablet by mouth daily, Disp: , Rfl:     insulin aspart (NOVOLOG FLEXPEN) 100 Units/mL SOPN, Inject under the skin, Disp: , Rfl:     insulin degludec (TRESIBA) injection pen 200 units/mL, Use 70 units daily, Disp: 3 pen, Rfl: 0    lisinopril (ZESTRIL) 2 5 mg tablet, Take 1 tablet by mouth daily, Disp: , Rfl:     metoprolol tartrate (LOPRESSOR) 100 mg tablet, Take 1 tablet by mouth 2 (two) times a day, Disp: , Rfl:     ONE TOUCH LANCETS MISC, TEST BLOOD SUGAR 4 TIMES DAILY E11 65, Disp: 400 each, Rfl: 2    prasugrel (EFFIENT) tablet, Take 1 tablet by mouth daily, Disp: , Rfl:     ciprofloxacin (CIPRO) 500 mg tablet, Take by mouth, Disp: , Rfl:   No Known Allergies    Review of Systems:  Review of Systems   Constitutional: Negative  Negative for fatigue  HENT: Negative  Eyes: Negative  Respiratory: Positive for cough (productive for white sputum) and shortness of breath (with exerttion)  O2 sat initially was low, and with O2 at 5 liters elevated to 91%, max  When talking goes to 83-  85 %  When talking  Dr Rachel Nails was notified, and has been aware of the low reading, which is the norm for the pt  Cardiovascular: Negative  Gastrointestinal: Positive for constipation (occasionally  ) and diarrhea (when eating too many veg )  Endocrine: Negative  Genitourinary: Positive for frequency  Nocturia x2   Musculoskeletal: Myalgias: thigh muscles on occasion hurt  Skin: Negative  Allergic/Immunologic: Negative  Neurological: Negative  Hematological: Negative  Psychiatric/Behavioral: Negative  Vitals:    02/20/18 0700 02/20/18 0756 02/20/18 0757 02/20/18 0759   BP: 121/59      BP Location: Right arm      Pulse: 72      Resp: 22      Temp: 97 7 °F (36 5 °C)      SpO2: (!) 80% (!) 83% 91% (!) 83%   Weight: 99 1 kg (218 lb 6 4 oz)      Height: 5' 5" (1 651 m)          Imaging:Cta Chest Pe Study    Result Date: 1/29/2018  Narrative: CTA - CHEST WITH IV CONTRAST - PULMONARY ANGIOGRAM INDICATION: Emphysema, worsening hypoxia  History of prostate cancer  Assess for PE, worsening ILD  COMPARISON: Chest CT exam from 9/13/2017 TECHNIQUE: CTA examination of the chest was performed using angiographic technique according to a protocol specifically tailored to evaluate for pulmonary embolism  Reformatted images were created in axial, sagittal, and coronal planes  In addition, coronal 3D MIP postprocessing was performed on the acquisition scanner  Radiation dose length product (DLP) for this visit:  399 mGy-cm   This examination, like all CT scans performed in the [facility], was performed utilizing techniques to minimize radiation dose exposure, including the use of iterative reconstruction and automated exposure control   IV Contrast: 85 mL of iohexol (OMNIPAQUE)          FINDINGS: PULMONARY ARTERIAL TREE:  No pulmonary embolus is seen  LUNGS:  Somewhat suboptimal evaluation due to respiratory motion  Persistent band of mild patchy groundglass opacity in the left upper lobe posteriorly, unchanged  Pneumonitis is a consideration  Stable 4 mm nodule in the left lower lobe centrally, image 40 series 3  Dependent atelectatic changes bilaterally  Underlying moderate emphysematous changes  PLEURA:  Unremarkable  HEART/AORTA:  Heart is mildly enlarged, stable  Dense coronary artery calcifications are noted  Thoracic aorta is normal caliber with moderate wall calcification  MEDIASTINUM AND LAURENCE:  Scattered mildly prominent mediastinal lymph nodes noted in the periaortic and paratracheal region, stable  A left para-aortic lymph node measures 1 2 x 1 1 cm, previously 1 2 x 1 0 cm, not a significant change  A right paratracheal lymph node measures 2 1 x 1 2 cm, previously 2 0 x 1 2 cm, stable  A right perihilar lymph node measures 1 5 x 1 2 cm, not seen on the prior noncontrast exam  CHEST WALL AND LOWER NECK:   Portions of the chest wall are omitted from the exam due to patient body habitus  Otherwise unremarkable  VISUALIZED STRUCTURES IN THE UPPER ABDOMEN:  Reflux of contrast material noted into the hepatic venous system  This suggests a component of right heart failure  OSSEOUS STRUCTURES:  No acute fracture or destructive osseous lesion  Impression: 1  No evidence of pulmonary embolism  2   Stable 4 mm pulmonary nodule in the left lower lobe  3   Persistent band of groundglass opacity in the left upper lobe posteriorly, nonspecific, question related to pneumonitis  4   Emphysematous changes  No overt findings for interstitial lung disease  5   Mildly prominent mediastinal or perihilar lymph nodes noted, nonspecific  Mediastinal lymph nodes are stable  Given the history of prostate cancer recommend continued follow-up   6   Reflux of contrast material noted into the hepatic venous system  This suggests a component of right heart failure  Workstation performed: [id number]     Physical Examination : Patient is alert and oriented, in his usual short of breath and on O2 but does not appear to be in distress  Lungs have diminished breath sounds  Heart tones are normal with sinus rhythm  Defer rectal examination today  Assessment : Stage IIb Reggie 8 adenocarcinoma prostate status post IMRT completed one month ago  Plan: 6 months follow-up  Discussion and Summary: O2 sat today was only in the 80's but he was no more symptomatic than his baseline  Back when he was under treatment he also had a few episodes of low O2 sat but refused to go to ER and he was fine  He is due for PSA and follow-up with Dr Sasha Gill in May  Teaching  Call pulmonary doctor for resp issues

## 2018-02-21 DIAGNOSIS — E11.65 TYPE 2 DIABETES MELLITUS WITH HYPERGLYCEMIA, UNSPECIFIED LONG TERM INSULIN USE STATUS: ICD-10-CM

## 2018-02-26 DIAGNOSIS — E11.65 TYPE 2 DIABETES MELLITUS WITH HYPERGLYCEMIA, UNSPECIFIED LONG TERM INSULIN USE STATUS: ICD-10-CM

## 2018-02-26 NOTE — TELEPHONE ENCOUNTER
Doris Bui called and stated that script has to have the type of strips, DX Code and test frequency    I set the script to print, so I can fax to the pharmacy  Can you please let me know when this is complete do I can fax over

## 2018-04-24 ENCOUNTER — TELEPHONE (OUTPATIENT)
Dept: ENDOCRINOLOGY | Facility: CLINIC | Age: 68
End: 2018-04-24

## 2018-04-25 ENCOUNTER — TELEPHONE (OUTPATIENT)
Dept: ENDOCRINOLOGY | Facility: CLINIC | Age: 68
End: 2018-04-25

## 2018-04-25 NOTE — TELEPHONE ENCOUNTER
----- Message from Alex Edgar, [address] sent at 4/24/2018  4:07 PM EDT -----  Patient left message and requested a call back, did not say what he wanted  He has an appointment with Minda Shepherd on 5/10/18

## 2018-04-26 DIAGNOSIS — Z79.4 ENCOUNTER FOR LONG-TERM (CURRENT) USE OF INSULIN (HCC): Primary | ICD-10-CM

## 2018-05-01 DIAGNOSIS — IMO0002 UNCONTROLLED TYPE 2 DIABETES MELLITUS WITH COMPLICATION, WITH LONG-TERM CURRENT USE OF INSULIN: Primary | ICD-10-CM

## 2018-05-02 DIAGNOSIS — C61 MALIGNANT NEOPLASM OF PROSTATE (HCC): ICD-10-CM

## 2018-05-03 LAB — PSA SERPL-MCNC: <0.1 NG/ML

## 2018-05-11 NOTE — PROGRESS NOTES
5/15/2018    Fortino Gurrola    1950  [id number]      Assessment  -Carrie 8 (4+4) prostate cancer    Discussion/Plan  Steven Grimes is a 77 y o  male being managed by Dr Jennifer Posadas        -We reviewed results of recent PSA which remains undetectable  Patient received 6 month Lupron injection in office today without any complications  Going forward, he will require 2 more injections to complete 2 year course of Lupron  -Patient has next scheduled follow up with radiation/oncology in 3 months   -Follow up in 6 months with PSA and Lupron injection  -All questions answered, patient agrees with plan      History of Present Illness  77 y o  male with a history of carrie 8 (4+4) prostate cancer in 3 of 3 cores involving 100% core tissues presents today for follow up  Initial PSA as high as 17 6  Patient received IGRT marker insertion on 11/2017 by Dr Jennifer Posadas  Radiation therapy completed on 1/24/18 per Dr Bertin Robert  His last Lupron injection was on 11/2/17  Patient denies any complications or side effects  He states that he has occasional hot flashes, but denies any urinary incontinence and voids without difficulties  No gross hematuria or dysuria  Review of Systems  Review of Systems   Constitutional: Negative  HENT: Negative  Respiratory: Negative  Cardiovascular: Negative  Gastrointestinal: Negative  Genitourinary: Negative for decreased urine volume, difficulty urinating, dysuria, flank pain, frequency, hematuria and urgency  Musculoskeletal: Negative  Skin: Negative  Neurological: Negative  Psychiatric/Behavioral: Negative          Past Medical History  Past Medical History:   Diagnosis Date    [facility]     Diabetes mellitus [facility]     Malignant neoplasm of prostate ([facility] )        Past Social History  Past Surgical History:   Procedure Laterality Date    CARDIAC SURGERY         Past Family History  No family history on file     Past Social history  Social History     Social History    Marital status: Single     Spouse name: N/A    Number of children: N/A    Years of education: N/A     Occupational History    Not on file       Social History Main Topics    Smoking status: Former Smoker    Smokeless tobacco: Never Used    Alcohol use No    Drug use: No    Sexual activity: Not on file     Other Topics Concern    Not on file     Social History Narrative    No narrative on file       Current Medications  Current Outpatient Prescriptions   Medication Sig Dispense Refill    albuterol (VENTOLIN HFA) 90 mcg/act inhaler Inhale      aspirin 325 mg tablet Take 1 tablet by mouth daily      atorvastatin (LIPITOR) 40 mg tablet Take 1 tablet by mouth daily      bicalutamide (CASODEX) 50 mg tablet Take 1 tablet by mouth Daily      Blood Glucose Monitoring Suppl (ONE TOUCH ULTRA 2) w/Device KIT by Does not apply route      ciprofloxacin (CIPRO) 500 mg tablet Take by mouth      famotidine (PEPCID) 20 mg tablet Take 1 tablet by mouth 2 (two) times a day      fluticasone furoate-vilanterol (BREO ELLIPTA) Inhale daily      glucose blood (ONE TOUCH ULTRA TEST) test strip Use as instructed 100 each 0    glucose blood (ONE TOUCH ULTRA TEST) test strip One Touch Ultra  TEST 4 TIMES DAILY  Diagnosis Code E11 65 400 each 5    hydrochlorothiazide (HYDRODIURIL) 12 5 mg tablet Take 1 tablet by mouth daily      insulin aspart (NOVOLOG FLEXPEN) 100 Units/mL SOPN Inject under the skin      Insulin Glargine (TOUJEO) injection pen 300 units/mL Inject 70 Units under the skin daily for 180 days 3 pen 0    Insulin Pen Needle 32G X 4 MM MISC by Does not apply route 3 (three) times a day before meals 300 each 0    lisinopril (ZESTRIL) 2 5 mg tablet Take 1 tablet by mouth daily      metoprolol tartrate (LOPRESSOR) 100 mg tablet Take 1 tablet by mouth 2 (two) times a day      ONE TOUCH LANCETS MISC TEST BLOOD SUGAR 4 TIMES DAILY E11 65 400 each 2  prasugrel (EFFIENT) tablet Take 1 tablet by mouth daily       No current facility-administered medications for this visit  Allergies  No Known Allergies    Past Medical History, Social History, Family History, medications and allergies were reviewed  Vitals  There were no vitals filed for this visit  Physical Exam  Physical Exam   Constitutional: He is oriented to person, place, and time  He appears well-developed and well-nourished  HENT:   Head: Normocephalic  Eyes: Pupils are equal, round, and reactive to light  Neck: Normal range of motion  Cardiovascular: Normal rate and regular rhythm  Pulmonary/Chest: Effort normal    Abdominal: Soft  Normal appearance  There is no CVA tenderness  Musculoskeletal: Normal range of motion  Neurological: He is alert and oriented to person, place, and time  He has normal reflexes  Skin: Skin is warm and dry  Psychiatric: He has a normal mood and affect  His behavior is normal  Judgment and thought content normal        Results    I have personally reviewed all pertinent lab results and reviewed with patient    PSA 5/2/18 <0 1    Lab Results   Component Value Date    CALCIUM 9 1 12/30/2017     12/30/2017    K 3 6 12/30/2017    CO2 30 12/30/2017    CL 99 12/30/2017    BUN 14 12/30/2017    CREATININE 0 77 12/30/2017     No results found for: WBC, HGB, HCT, MCV, PLT  No results found for this or any previous visit (from the past 1 hour(s))

## 2018-05-15 ENCOUNTER — OFFICE VISIT (OUTPATIENT)
Dept: UROLOGY | Facility: AMBULATORY SURGERY CENTER | Age: 68
End: 2018-05-15
Payer: MEDICARE

## 2018-05-15 VITALS
DIASTOLIC BLOOD PRESSURE: 70 MMHG | HEART RATE: 76 BPM | BODY MASS INDEX: 34.99 KG/M2 | HEIGHT: 65 IN | SYSTOLIC BLOOD PRESSURE: 140 MMHG | WEIGHT: 210 LBS

## 2018-05-15 DIAGNOSIS — C61 PROSTATE CANCER (HCC): Primary | ICD-10-CM

## 2018-05-15 PROCEDURE — 96402 CHEMO HORMON ANTINEOPL SQ/IM: CPT

## 2018-05-15 PROCEDURE — 99213 OFFICE O/P EST LOW 20 MIN: CPT | Performed by: NURSE PRACTITIONER

## 2018-06-19 ENCOUNTER — OFFICE VISIT (OUTPATIENT)
Dept: ENDOCRINOLOGY | Facility: CLINIC | Age: 68
End: 2018-06-19
Payer: MEDICARE

## 2018-06-19 VITALS
SYSTOLIC BLOOD PRESSURE: 102 MMHG | WEIGHT: 217 LBS | BODY MASS INDEX: 36.15 KG/M2 | HEART RATE: 82 BPM | DIASTOLIC BLOOD PRESSURE: 54 MMHG | HEIGHT: 65 IN

## 2018-06-19 DIAGNOSIS — I10 HYPERTENSION, UNSPECIFIED TYPE: ICD-10-CM

## 2018-06-19 DIAGNOSIS — E16.2 HYPOGLYCEMIA: ICD-10-CM

## 2018-06-19 DIAGNOSIS — E78.5 HYPERLIPIDEMIA, UNSPECIFIED HYPERLIPIDEMIA TYPE: ICD-10-CM

## 2018-06-19 DIAGNOSIS — E11.65 UNCONTROLLED TYPE 2 DIABETES MELLITUS WITH HYPERGLYCEMIA, WITH LONG-TERM CURRENT USE OF INSULIN (HCC): Primary | ICD-10-CM

## 2018-06-19 DIAGNOSIS — Z79.4 UNCONTROLLED TYPE 2 DIABETES MELLITUS WITH HYPERGLYCEMIA, WITH LONG-TERM CURRENT USE OF INSULIN (HCC): Primary | ICD-10-CM

## 2018-06-19 PROBLEM — IMO0002 UNCONTROLLED TYPE 2 DIABETES MELLITUS, WITH LONG-TERM CURRENT USE OF INSULIN: Status: ACTIVE | Noted: 2017-10-11

## 2018-06-19 PROCEDURE — 99214 OFFICE O/P EST MOD 30 MIN: CPT | Performed by: PHYSICIAN ASSISTANT

## 2018-06-19 NOTE — ASSESSMENT & PLAN NOTE
Glucometer readings stable with some excursions due to diet  Continue current medications  Discussed importance of dietary consistency to prevent fluctuating glucose readings  Declines referral to dietician at this time  Check A1C and BMP

## 2018-06-19 NOTE — ASSESSMENT & PLAN NOTE
Infrequent  Glucose low on last lab test but he denies any recent hypoglycemia  He may have taken his novolog morning of lab when not eating    Reminded him him that he needs to skip his novolog if he will be skipping a meal

## 2018-06-19 NOTE — PROGRESS NOTES
Established Patient Progress Note      Chief Complaint   Patient presents with    Diabetes Type 2          History of Present Illness:   Bernie Richardson Sr  is a 77 y o  male with a history of type 2 diabetes with long term use of insulin   Denies recent illness or hospitalizations  Denies recent severe hypoglycemic or severe hyperglycemic episodes  Denies any issues with his current regimen  home glucose monitoring: are performed regularly 4x per day  Home blood glucose readings:   Before breakfast: 90s-164, usually under 140  Before lunch: , variable  Before dinner: , usually under 150  Bedtime: 105-220, usually under 180    Current regimen: Toujeo 70 units daily  Novolog 26-24-26 before meals    Last Eye Exam: 3/2017  Last Foot Exam: UTD with podiatry, appt July  Has hypertension: Taking metoprolol and lisinopril and HCTZ  Has hyperlipidemia: Taking atorvastatin    Thyroid disorders: none    Patient Active Problem List   Diagnosis    Prostate cancer ([place] [address] )    Uncontrolled type 2 diabetes mellitus, with long-term current use of insulin ([place] [address] )    Hypertension    Hyperlipidemia    Hypoglycemia      Past Medical History:   Diagnosis Date    COPD (chronic obstructive pulmonary disease) ([place] [address] )     Diabetes mellitus ([place] [address] )     Malignant neoplasm of prostate ([place] [address] )       Past Surgical History:   Procedure Laterality Date    CARDIAC SURGERY        History reviewed  No pertinent family history    Social History   Substance Use Topics    Smoking status: Former Smoker    Smokeless tobacco: Never Used    Alcohol use No     No Known Allergies      Current Outpatient Prescriptions:     albuterol (VENTOLIN HFA) 90 mcg/act inhaler, Inhale, Disp: , Rfl:     aspirin 325 mg tablet, Take 1 tablet by mouth daily, Disp: , Rfl:     atorvastatin (LIPITOR) 40 mg tablet, Take 1 tablet by mouth daily, Disp: , Rfl:     bicalutamide (CASODEX) 50 mg tablet, Take 1 tablet by mouth Daily, Disp: , Rfl:    Blood Glucose Monitoring Suppl (ONE TOUCH ULTRA 2) w/Device KIT, by Does not apply route, Disp: , Rfl:     famotidine (PEPCID) 20 mg tablet, Take 1 tablet by mouth 2 (two) times a day, Disp: , Rfl:     fluticasone furoate-vilanterol (BREO ELLIPTA), Inhale daily, Disp: , Rfl:     glucose blood (ONE TOUCH ULTRA TEST) test strip, Use as instructed, Disp: 100 each, Rfl: 0    glucose blood (ONE TOUCH ULTRA TEST) test strip, One Touch Ultra  TEST 4 TIMES DAILY  Diagnosis Code E11 65, Disp: 400 each, Rfl: 5    hydrochlorothiazide (HYDRODIURIL) 12 5 mg tablet, Take 1 tablet by mouth daily, Disp: , Rfl:     insulin aspart (NOVOLOG FLEXPEN) 100 Units/mL SOPN, Inject under the skin, Disp: , Rfl:     Insulin Glargine (TOUJEO) injection pen 300 units/mL, Inject 70 Units under the skin daily for 180 days, Disp: 3 pen, Rfl: 0    Insulin Pen Needle 32G X 4 MM MISC, by Does not apply route 3 (three) times a day before meals, Disp: 300 each, Rfl: 0    lisinopril (ZESTRIL) 2 5 mg tablet, Take 1 tablet by mouth daily, Disp: , Rfl:     metoprolol tartrate (LOPRESSOR) 100 mg tablet, Take 1 tablet by mouth 2 (two) times a day, Disp: , Rfl:     ONE TOUCH LANCETS MISC, TEST BLOOD SUGAR 4 TIMES DAILY E11 65, Disp: 400 each, Rfl: 2    prasugrel (EFFIENT) tablet, Take 1 tablet by mouth daily, Disp: , Rfl:     Review of Systems   Constitutional: Negative for activity change, appetite change and fatigue  HENT: Negative for sore throat, trouble swallowing and voice change  Eyes: Negative for visual disturbance  Respiratory: Positive for shortness of breath  Negative for choking and chest tightness  Cardiovascular: Negative for chest pain, palpitations and leg swelling  Gastrointestinal: Negative for abdominal pain, constipation and diarrhea  Endocrine: Negative for cold intolerance, heat intolerance, polydipsia, polyphagia and polyuria  Genitourinary: Negative for frequency     Musculoskeletal: Negative for arthralgias and myalgias  Skin: Negative for rash  Neurological: Negative for dizziness and syncope  Hematological: Negative for adenopathy  Psychiatric/Behavioral: Negative for sleep disturbance  All other systems reviewed and are negative  Physical Exam:  Body mass index is 36 11 kg/m²  /54   Pulse 82   Ht 5' 5" (1 651 m)   Wt 98 4 kg (217 lb)   BMI 36 11 kg/m²    Wt Readings from Last 3 Encounters:   06/19/18 98 4 kg (217 lb)   05/15/18 95 3 kg (210 lb)   02/20/18 99 1 kg (218 lb 6 4 oz)       Physical Exam   Constitutional: He is oriented to person, place, and time  He appears well-developed and well-nourished  No distress  Using oxygen   HENT:   Head: Normocephalic and atraumatic  Mouth/Throat: Oropharynx is clear and moist    Eyes: Conjunctivae and EOM are normal  Pupils are equal, round, and reactive to light  Neck: Normal range of motion  Neck supple  No thyromegaly present  Cardiovascular: Normal rate, regular rhythm and normal heart sounds  No murmur heard  Pulmonary/Chest: Effort normal and breath sounds normal  No respiratory distress  He has no wheezes  He has no rales  Abdominal: Soft  Bowel sounds are normal  He exhibits no distension  There is no tenderness  Musculoskeletal: Normal range of motion  He exhibits no edema  Lymphadenopathy:     He has no cervical adenopathy  Neurological: He is alert and oriented to person, place, and time  Skin: Skin is warm and dry  Psychiatric: He has a normal mood and affect  Vitals reviewed      Diabetic Foot Exam    Labs:     Lab Results   Component Value Date    HGBA1C 7 4 (H) 12/30/2017     No results found for: GLU  Lab Results   Component Value Date    HGBA1C 7 4 (H) 12/30/2017       Lab Results   Component Value Date    CREATININE 0 77 12/30/2017    CREATININE 0 73 09/15/2017    CREATININE 0 80 06/15/2017    BUN 14 12/30/2017     12/30/2017    K 3 6 12/30/2017    CL 99 12/30/2017    CO2 30 12/30/2017     No results found for: EGFR    Lab Results   Component Value Date    CHOL 128 12/30/2017    HDL 44 12/30/2017    TRIG 142 12/30/2017       Lab Results   Component Value Date    ALT 14 12/30/2017    AST 14 12/30/2017    ALKPHOS 90 12/30/2017    BILITOT 0 6 12/30/2017       Lab Results   Component Value Date    [id number] 1 61 08/02/2016     Lab Results   Component Value Date    FREET4 1 2 08/02/2016       Impression & Plan:    Problem List Items Addressed This Visit     Uncontrolled type 2 diabetes mellitus, with long-term current use of insulin ([address] ) - Primary     Glucometer readings stable with some excursions due to diet  Continue current medications  Discussed importance of dietary consistency to prevent fluctuating glucose readings  Declines referral to dietician at this time  Check A1C and BMP  Relevant Orders    Hemoglobin A5V    Basic metabolic panel    Hypertension     Continue current meds  Hyperlipidemia     Continue Statin  Hypoglycemia     Infrequent  Glucose low on last lab test but he denies any recent hypoglycemia  He may have taken his novolog morning of lab when not eating  Reminded him him that he needs to skip his novolog if he will be skipping a meal                 Orders Placed This Encounter   Procedures    Hemoglobin A1C     Standing Status:   Future     Standing Expiration Date:   12/19/2018    Basic metabolic panel     This is a patient instruction: Patient fasting for 8 hours or longer recommended  Standing Status:   Future     Standing Expiration Date:   12/19/2018       There are no Patient Instructions on file for this visit  Discussed with the patient and all questioned fully answered  He will call me if any problems arise  Follow-up appointment in 4 months       Counseled patient on diagnostic results, prognosis, risk and benefit of treatment options, instruction for management, importance of treatment compliance, Risk  factor reduction and impressions      Rex Vang PA-C

## 2018-06-19 NOTE — LETTER
June 19, 2018     Prospercorinne Arevalo   [address] [place], [place] [id number]    Patient: Cole Lee  YOB: 1950   Date of Visit: 6/19/2018       Dear Dr Abner Hill: Thank you for referring Chencho Burks to me for evaluation  Below are my notes for this consultation  If you have questions, please do not hesitate to call me  I look forward to following your patient along with you  Sincerely,        Deisy Underwood PA-C        CC: No Recipients  Julianne Mchugh Hof  6/19/2018  6:14 PM  Sign at close encounter      Established Patient Progress Note      Chief Complaint   Patient presents with    Diabetes Type 2          History of Present Illness:   Carie Ballesteros Sr  is a 77 y o  male with a history of type 2 diabetes with long term use of insulin   Denies recent illness or hospitalizations  Denies recent severe hypoglycemic or severe hyperglycemic episodes  Denies any issues with his current regimen  home glucose monitoring: are performed regularly 4x per day  Home blood glucose readings:   Before breakfast: 90s-164, usually under 140  Before lunch: , variable  Before dinner: , usually under 150  Bedtime: 105-220, usually under 180    Current regimen: Toujeo 70 units daily  Novolog 26-24-26 before meals    Last Eye Exam: 3/2017  Last Foot Exam: UTD with podiatry, appt July       Has hypertension: Taking metoprolol and lisinopril and HCTZ  Has hyperlipidemia: Taking atorvastatin    Thyroid disorders: none    Patient Active Problem List   Diagnosis    [facility]    Uncontrolled type [facility] diabetes mellitus[facility] with [facility]term [facility]    Hypertension    Hyperlipidemia    Hypoglycemia      Past Medical History:   Diagnosis Date    COPD (chronic obstructive pulmonary disease) ([facility]     Diabetes mellitus ([facility] )     [facility]       Past Surgical History:   Procedure Laterality Date    CARDIAC SURGERY History reviewed  No pertinent family history    Social History   Substance Use Topics    Smoking status: Former Smoker    Smokeless tobacco: Never Used    Alcohol use No     No Known Allergies      Current Outpatient Prescriptions:     albuterol (VENTOLIN HFA) 90 mcg/act inhaler, Inhale, Disp: , Rfl:     aspirin 325 mg tablet, Take 1 tablet by mouth daily, Disp: , Rfl:     atorvastatin (LIPITOR) 40 mg tablet, Take 1 tablet by mouth daily, Disp: , Rfl:     bicalutamide (CASODEX) 50 mg tablet, Take 1 tablet by mouth Daily, Disp: , Rfl:     Blood Glucose Monitoring Suppl (ONE TOUCH ULTRA 2) w/Device KIT, by Does not apply route, Disp: , Rfl:     famotidine (PEPCID) 20 mg tablet, Take 1 tablet by mouth 2 (two) times a day, Disp: , Rfl:     fluticasone furoate-vilanterol (BREO ELLIPTA), Inhale daily, Disp: , Rfl:     glucose blood (ONE TOUCH ULTRA TEST) test strip, Use as instructed, Disp: 100 each, Rfl: 0    glucose blood (ONE TOUCH ULTRA TEST) test strip, One Touch Ultra  TEST 4 TIMES DAILY  Diagnosis Code E11 65, Disp: 400 each, Rfl: 5    hydrochlorothiazide (HYDRODIURIL) 12 5 mg tablet, Take 1 tablet by mouth daily, Disp: , Rfl:     insulin aspart (NOVOLOG FLEXPEN) 100 Units/mL SOPN, Inject under the skin, Disp: , Rfl:     Insulin Glargine (TOUJEO) injection pen 300 units/mL, Inject 70 Units under the skin daily for 180 days, Disp: 3 pen, Rfl: 0    Insulin Pen Needle 32G X 4 MM MISC, by Does not apply route 3 (three) times a day before meals, Disp: 300 each, Rfl: 0    lisinopril (ZESTRIL) 2 5 mg tablet, Take 1 tablet by mouth daily, Disp: , Rfl:     metoprolol tartrate (LOPRESSOR) 100 mg tablet, Take 1 tablet by mouth 2 (two) times a day, Disp: , Rfl:     ONE TOUCH LANCETS MISC, TEST BLOOD SUGAR 4 TIMES DAILY E11 65, Disp: 400 each, Rfl: 2    prasugrel (EFFIENT) tablet, Take 1 tablet by mouth daily, Disp: , Rfl:     Review of Systems   Constitutional: Negative for activity change, appetite change and fatigue  HENT: Negative for sore throat, trouble swallowing and voice change  Eyes: Negative for visual disturbance  Respiratory: Positive for shortness of breath  Negative for choking and chest tightness  Cardiovascular: Negative for chest pain, palpitations and leg swelling  Gastrointestinal: Negative for abdominal pain, constipation and diarrhea  Endocrine: Negative for cold intolerance, heat intolerance, polydipsia, polyphagia and polyuria  Genitourinary: Negative for frequency  Musculoskeletal: Negative for arthralgias and myalgias  Skin: Negative for rash  Neurological: Negative for dizziness and syncope  Hematological: Negative for adenopathy  Psychiatric/Behavioral: Negative for sleep disturbance  All other systems reviewed and are negative  Physical Exam:  Body mass index is 36 11 kg/m²  /54   Pulse 82   Ht 5' 5" (1 651 m)   Wt 98 4 kg (217 lb)   BMI 36 11 kg/m²     Wt Readings from Last 3 Encounters:   06/19/18 98 4 kg (217 lb)   05/15/18 95 3 kg (210 lb)   02/20/18 99 1 kg (218 lb 6 4 oz)       Physical Exam   Constitutional: He is oriented to person, place, and time  He appears well-developed and well-nourished  No distress  Using oxygen   HENT:   Head: Normocephalic and atraumatic  Mouth/Throat: Oropharynx is clear and moist    Eyes: Conjunctivae and EOM are normal  Pupils are equal, round, and reactive to light  Neck: Normal range of motion  Neck supple  No thyromegaly present  Cardiovascular: Normal rate, regular rhythm and normal heart sounds  No murmur heard  Pulmonary/Chest: Effort normal and breath sounds normal  No respiratory distress  He has no wheezes  He has no rales  Abdominal: Soft  Bowel sounds are normal  He exhibits no distension  There is no tenderness  Musculoskeletal: Normal range of motion  He exhibits no edema  Lymphadenopathy:     He has no cervical adenopathy     Neurological: He is alert and oriented to person, place, and time  Skin: Skin is warm and dry  Psychiatric: He has a normal mood and affect  Vitals reviewed  Diabetic Foot Exam    Labs:     Lab Results   Component Value Date    HGBA1C 7 4 (H) 12/30/2017     No results found for: GLU  Lab Results   Component Value Date    HGBA1C 7 4 (H) 12/30/2017       Lab Results   Component Value Date    CREATININE 0 77 12/30/2017    CREATININE 0 73 09/15/2017    CREATININE 0 80 06/15/2017    BUN 14 12/30/2017     12/30/2017    K 3 6 12/30/2017    CL 99 12/30/2017    CO2 30 12/30/2017     No results found for: EGFR    Lab Results   Component Value Date    CHOL 128 12/30/2017    HDL 44 12/30/2017    TRIG 142 12/30/2017       Lab Results   Component Value Date    ALT 14 12/30/2017    AST 14 12/30/2017    ALKPHOS 90 12/30/2017    BILITOT 0 6 12/30/2017       Lab Results   Component Value Date    [id number] 1 61 08/02/2016     Lab Results   Component Value Date    FREET4 1 2 08/02/2016       Impression & Plan:    Problem List Items Addressed This Visit     Uncontrolled type 2 diabetes mellitus, with long-term current use of insulin ([address] ) - Primary     Glucometer readings stable with some excursions due to diet  Continue current medications  Discussed importance of dietary consistency to prevent fluctuating glucose readings  Declines referral to dietician at this time  Check A1C and BMP  Relevant Orders    Hemoglobin G4S    Basic metabolic panel    Hypertension     Continue current meds  Hyperlipidemia     Continue Statin  Hypoglycemia     Infrequent  Glucose low on last lab test but he denies any recent hypoglycemia  He may have taken his novolog morning of lab when not eating    Reminded him him that he needs to skip his novolog if he will be skipping a meal                 Orders Placed This Encounter   Procedures    Hemoglobin A1C     Standing Status:   Future     Standing Expiration Date:   12/19/2018    Basic metabolic panel     This is a patient instruction: Patient fasting for 8 hours or longer recommended  Standing Status:   Future     Standing Expiration Date:   12/19/2018       There are no Patient Instructions on file for this visit  Discussed with the patient and all questioned fully answered  He will call me if any problems arise  Follow-up appointment in 4 months       Counseled patient on diagnostic results, prognosis, risk and benefit of treatment options, instruction for management, importance of treatment compliance, Risk  factor reduction and impressions      Alla Mancera PA-C

## 2018-07-11 LAB
ALBUMIN SERPL-MCNC: 3.9 G/DL (ref 3.6–5.1)
ALBUMIN/GLOB SERPL: 1.6 (CALC) (ref 1–2.5)
ALP SERPL-CCNC: 94 U/L (ref 40–115)
ALT SERPL-CCNC: 13 U/L (ref 9–46)
AST SERPL-CCNC: 12 U/L (ref 10–35)
BILIRUB SERPL-MCNC: 0.5 MG/DL (ref 0.2–1.2)
BUN SERPL-MCNC: 14 MG/DL (ref 7–25)
BUN/CREAT SERPL: ABNORMAL (CALC) (ref 6–22)
CALCIUM SERPL-MCNC: 9.5 MG/DL (ref 8.6–10.3)
CHLORIDE SERPL-SCNC: 98 MMOL/L (ref 98–110)
CHOLEST SERPL-MCNC: 118 MG/DL
CHOLEST/HDLC SERPL: 2.9 (CALC)
CO2 SERPL-SCNC: 31 MMOL/L (ref 20–31)
CREAT SERPL-MCNC: 0.76 MG/DL (ref 0.7–1.18)
GLOBULIN SER CALC-MCNC: 2.5 G/DL (CALC) (ref 1.9–3.7)
GLUCOSE SERPL-MCNC: 53 MG/DL (ref 65–99)
HBA1C MFR BLD: 6.8 % OF TOTAL HGB
HDLC SERPL-MCNC: 41 MG/DL
LDLC SERPL CALC-MCNC: 56 MG/DL (CALC)
NONHDLC SERPL-MCNC: 77 MG/DL (CALC)
POTASSIUM SERPL-SCNC: 4 MMOL/L (ref 3.5–5.3)
PROT SERPL-MCNC: 6.4 G/DL (ref 6.1–8.1)
PSA SERPL-MCNC: <0.1 NG/ML
SL AMB EGFR AFRICAN AMERICAN: 107 ML/MIN/1.73M2
SL AMB EGFR NON AFRICAN AMERICAN: 92 ML/MIN/1.73M2
SODIUM SERPL-SCNC: 139 MMOL/L (ref 135–146)
TRIGL SERPL-MCNC: 131 MG/DL

## 2018-07-12 ENCOUNTER — TELEPHONE (OUTPATIENT)
Dept: ENDOCRINOLOGY | Facility: CLINIC | Age: 68
End: 2018-07-12

## 2018-07-12 NOTE — TELEPHONE ENCOUNTER
Pt was rx'd Toujeo 300unit/ml pens however they are not covered by insurance  The [facility], will it be the 100unit/ml or the 200unit/ml pen in it's place? Please advise  Directions are to inject 70 units daily

## 2018-07-17 DIAGNOSIS — E11.8 UNCONTROLLED TYPE 2 DIABETES MELLITUS WITH COMPLICATION, UNSPECIFIED WHETHER LONG TERM INSULIN USE: Primary | ICD-10-CM

## 2018-07-17 DIAGNOSIS — E11.65 UNCONTROLLED TYPE 2 DIABETES MELLITUS WITH COMPLICATION, UNSPECIFIED WHETHER LONG TERM INSULIN USE: Primary | ICD-10-CM

## 2018-07-17 RX ORDER — INSULIN DEGLUDEC 200 U/ML
INJECTION, SOLUTION SUBCUTANEOUS
Qty: 11 PEN | Refills: 1 | Status: SHIPPED | OUTPATIENT
Start: 2018-07-17 | End: 2018-09-13 | Stop reason: SDUPTHER

## 2018-07-23 ENCOUNTER — TELEPHONE (OUTPATIENT)
Dept: ENDOCRINOLOGY | Facility: CLINIC | Age: 68
End: 2018-07-23

## 2018-07-23 DIAGNOSIS — IMO0002 UNCONTROLLED TYPE 2 DIABETES MELLITUS WITH COMPLICATION, WITH LONG-TERM CURRENT USE OF INSULIN: ICD-10-CM

## 2018-07-24 DIAGNOSIS — IMO0002 UNCONTROLLED TYPE 2 DIABETES MELLITUS WITH COMPLICATION, WITH LONG-TERM CURRENT USE OF INSULIN: ICD-10-CM

## 2018-08-21 DIAGNOSIS — IMO0002 UNCONTROLLED TYPE 2 DIABETES MELLITUS WITH COMPLICATION, WITH LONG-TERM CURRENT USE OF INSULIN: Primary | ICD-10-CM

## 2018-08-28 ENCOUNTER — CLINICAL SUPPORT (OUTPATIENT)
Dept: RADIATION ONCOLOGY | Facility: HOSPITAL | Age: 68
End: 2018-08-28
Payer: MEDICARE

## 2018-08-28 ENCOUNTER — RADIATION ONCOLOGY FOLLOW-UP (OUTPATIENT)
Dept: RADIATION ONCOLOGY | Facility: HOSPITAL | Age: 68
End: 2018-08-28
Attending: RADIOLOGY
Payer: MEDICARE

## 2018-08-28 VITALS
HEIGHT: 65 IN | WEIGHT: 217.8 LBS | BODY MASS INDEX: 36.29 KG/M2 | HEART RATE: 62 BPM | RESPIRATION RATE: 18 BRPM | SYSTOLIC BLOOD PRESSURE: 102 MMHG | TEMPERATURE: 97.3 F | DIASTOLIC BLOOD PRESSURE: 50 MMHG | OXYGEN SATURATION: 88 %

## 2018-08-28 DIAGNOSIS — C61 PROSTATE CANCER (HCC): Primary | ICD-10-CM

## 2018-08-28 PROCEDURE — 99214 OFFICE O/P EST MOD 30 MIN: CPT | Performed by: RADIOLOGY

## 2018-08-28 NOTE — PROGRESS NOTES
Tabitha Zelaya Sr  No diagnosis found  Cancer Staging  No matching staging information was found for the patient  Oncology History    Pt returns for routine f/u post radiation for prostate cancer which was completed 1/24/18  He had a f/u with Rad Onc 2/20/18 5/2/18 PSA was <0 1    5/15/18 Had Urol f/u  He received 6 month Lupron injection  He requires 2 more injections to complete 2 yrs of ADT  7/10/18 PSA  < 0 1     11/15/18 His next Urol f/u     Component      Latest Ref Rng & Units 9/22/2016 2/13/2017 8/2/2017           9:08 AM  9:02 AM 10:09 AM   PROSTATE SPECIFIC ANTIGEN TOTAL      < OR = 4 0 ng/mL 13 0 (H) 14 2 (H) 17 6 (H)     Component      Latest Ref Rng & Units 5/2/2018 7/10/2018          11:03 AM 10:14 AM   PROSTATE SPECIFIC ANTIGEN TOTAL      < OR = 4 0 ng/mL <0 1 <0 1               Prostate [facility] ([facility] )    9/14/2017 Initial Diagnosis     Prostate cancer ([facility] )         9/14/2017 Biopsy     [place] score 8         10/18/2017 -  Hormone Therapy      Bicalutamide 50 MG Oral Tablet; TAKE 1 TABLET DAILY   11/2/17 Lupron Depot (6-Month) 45 MG Intramuscular Kit  5/15/18 (6 month ) Lupron injection           11/21/2017 - 1/24/2018 Radiation     Treatments:  Course: C1    Plan ID Energy Fractions Dose per Fraction (cGy) Total Dose Delivered (cGy) Elapsed Days   Prostate_SV 6X 44 / 44 180 7,920 64      Treatment dates:  11/21/2017 - 1/24/2018              Interval History:***    GYN History:***    Patient Active Problem List   Diagnosis    Prostate [facility]    Uncontrolled type 2 diabetes mellitus, with long-term current use of insulin ([facility] )    Hypertension    Hyperlipidemia    Hypoglycemia     Past Medical History:   Diagnosis Date    COPD (chronic obstructive pulmonary disease) ([facility] )     Diabetes mellitus ([facility] )     Malignant neoplasm of prostate ([facility] )      Past Surgical History:   Procedure Laterality Date    CARDIAC SURGERY       No family history on file    Social History     Social History    Marital status: Single     Spouse name: N/A    Number of children: N/A    Years of education: N/A     Occupational History    Not on file       Social History Main Topics    Smoking status: Former Smoker    Smokeless tobacco: Never Used    Alcohol use No    Drug use: No    Sexual activity: Not on file     Other Topics Concern    Not on file     Social History Narrative    No narrative on file       Current Outpatient Prescriptions:     aspirin 325 mg tablet, Take 1 tablet by mouth daily, Disp: , Rfl:     atorvastatin (LIPITOR) 40 mg tablet, Take 1 tablet by mouth daily, Disp: , Rfl:     bicalutamide (CASODEX) 50 mg tablet, Take 1 tablet by mouth Daily, Disp: , Rfl:     Blood Glucose Monitoring Suppl (ONE TOUCH ULTRA 2) w/Device KIT, by Does not apply route, Disp: , Rfl:     famotidine (PEPCID) 20 mg tablet, Take 1 tablet by mouth 2 (two) times a day, Disp: , Rfl:     glucose blood (ONE TOUCH ULTRA TEST) test strip, Use as instructed, Disp: 100 each, Rfl: 0    glucose blood (ONE TOUCH ULTRA TEST) test strip, One Touch Ultra  TEST 4 TIMES DAILY  Diagnosis Code E11 65, Disp: 400 each, Rfl: 5    hydrochlorothiazide (HYDRODIURIL) 12 5 mg tablet, Take 1 tablet by mouth daily, Disp: , Rfl:     insulin aspart (NOVOLOG FLEXPEN) 100 Units/mL injection pen, Inject 24 units with breakfast, 26 units with lunch and 24 units with dinner, Disp: 25 pen, Rfl: 1    Insulin Pen Needle 32G X 4 MM MISC, Using 4 times daily as directed, Disp: 360 each, Rfl: 1    lisinopril (ZESTRIL) 2 5 mg tablet, Take 1 tablet by mouth daily, Disp: , Rfl:     metoprolol tartrate (LOPRESSOR) 100 mg tablet, Take 1 tablet by mouth 2 (two) times a day, Disp: , Rfl:     ONE TOUCH LANCETS MISC, TEST BLOOD SUGAR 4 TIMES DAILY E11 65, Disp: 400 each, Rfl: 2    prasugrel (EFFIENT) tablet, Take 1 tablet by mouth daily, Disp: , Rfl:     TRESIBA FLEXTOUCH 200 units/mL CONCENTRATED U-200 injection pen, Injecting 70 units daily as directed Dx: E11 65, Disp: 11 pen, Rfl: 1  No Known Allergies    Review of Systems:  Review of Systems    Vitals:    08/28/18 0900   BP: 102/50   BP Location: Right arm   Pulse: 62   Resp: 18   Temp: (!) 97 3 °F (36 3 °C)   SpO2: (!) 88%   Weight: 98 8 kg (217 lb 12 8 oz)   Height: 5' 5" (1 651 m)       Imaging:No results found      Teaching:***

## 2018-08-28 NOTE — PROGRESS NOTES
Fortino Gurrola    1950   Mr Marychuy Aceves is a 77 y o  male       Chief Complaint   Patient presents with    Follow-up     Assessment:   [place] 8 adenocarcinoma prostate status post radiation therapy (IMRT) completed January of this year and ongoing ADT will be getting a total of 2 years  Plan: Follow-up in spring of next year  Discussion and summary:  His last PSA July 10 was less than 0 1  Patient has no urinary or bowel complaints  He has advanced COPD and on oxygen 24/7  Physical examination:  There are no palpable nodes  Lungs are clear  No abdominal masses  He did not want rectal examination today  Cancer Staging  No matching staging information was found for the patient  Oncology History    Pt returns for routine f/u post radiation for prostate cancer which was completed 1/24/18  He had a f/u with Rad Onc 2/20/18 5/2/18 PSA was <0 1    5/15/18 Had Urol f/u  He received 6 month Lupron injection  He requires 2 more injections to complete 2 yrs of ADT      7/10/18 PSA  < 0 1     11/15/18 His next Urol f/u     Component      Latest Ref Rng & Units 9/22/2016 2/13/2017 8/2/2017           9:08 AM  9:02 AM 10:09 AM   PROSTATE SPECIFIC ANTIGEN TOTAL      < OR = 4 0 ng/mL 13 0 (H) 14 2 (H) 17 6 (H)     Component      Latest Ref Rng & Units 5/2/2018 7/10/2018          11:03 AM 10:14 AM   PROSTATE SPECIFIC ANTIGEN TOTAL      < OR = 4 0 ng/mL <0 1 <0 1               Prostate cancer ([facility] )    9/14/2017 Initial Diagnosis     Prostate cancer ([facility] )         9/14/2017 Biopsy     [place] score 8         10/18/2017 -  Hormone Therapy      Bicalutamide 50 MG Oral Tablet; TAKE 1 TABLET DAILY   11/2/17 Lupron Depot (6-Month) 45 MG Intramuscular Kit  5/15/18 (6 month ) Lupron injection           11/21/2017 - 1/24/2018 Radiation     Treatments:  Course: C1    Plan ID Energy Fractions Dose per Fraction (cGy) Total Dose Delivered (cGy) Elapsed Days   Prostate_SV 6X 44 / 44 180 7,920 64      Treatment dates:  11/21/2017 - 1/24/2018              Clinical Trial: no        Interval History:Pt returns for routine f/u post radiation for prostate cancer which was completed 1/24/18  He had a f/u with Rad Onc 2/20/18 5/2/18 PSA was <0 1    5/15/18 Had Urol f/u  He received 6 month Lupron injection  He requires 2 more injections to complete 2 yrs of ADT  7/10/18 PSA  < 0 1     11/15/18 His next Urol f/u       Screening  Tobacco  Current tobacco user: no  If yes, brief counseling provided: NA    Hypertension  Hypertension screening performed: yes  Normotensive:  yes  If no, referred to PCP: no    Depression Screening  Screened for depression using PHQ-2: yes    Screened for depression using PHQ-9:  yes  Screening positive or negative:  negative  If score >4, was any of the following actions taken?    Additional evaluation for depression, suicide risk assesment, referral to PCP or psychiatry, medication started:  no    Advanced Care Planning for Patients >65 years  Advanced Care Planning Discussed:  Yes  Patient named surrogate decision maker or care plan in chart: no    [unfilled]  Health Maintenance   Topic Date Due    Depression Screening PHQ-9  1950    CRC Screening: Colonoscopy  1950    Fall Risk  03/31/2013    Pneumococcal PPSV23/PCV13 65+ Years / High and Highest Risk (2 of 2 - PPSV23) 08/24/2016    DM Eye Exam  03/09/2017    Diabetic Foot Exam  03/22/2018    INFLUENZA VACCINE  09/01/2018    URINE MICROALBUMIN  01/08/2019    HEMOGLOBIN A1C  01/10/2019    DTaP,Tdap,and Td Vaccines (2 - Td) 12/25/2027       Patient Active Problem List   Diagnosis    [facility]    Uncontrolled type 2 diabetes mellitus, with long-term current use of insulin [facility]    Hypertension    Hyperlipidemia    Hypoglycemia     Past Medical History:   Diagnosis Date    [facility]ch[facility] obstructive [facility] disease) [facility]     Diabetes mellitus ([facility]     Malignant neoplasm of prostate ([facility] ) Past Surgical History:   Procedure Laterality Date    CARDIAC SURGERY       No family history on file  Social History     Social History    Marital status: Single     Spouse name: N/A    Number of children: N/A    Years of education: N/A     Occupational History    Not on file       Social History Main Topics    Smoking status: Former Smoker    Smokeless tobacco: Never Used    Alcohol use No    Drug use: No    Sexual activity: Not on file     Other Topics Concern    Not on file     Social History Narrative    No narrative on file       Current Outpatient Prescriptions:     aspirin 325 mg tablet, Take 1 tablet by mouth daily, Disp: , Rfl:     atorvastatin (LIPITOR) 40 mg tablet, Take 1 tablet by mouth daily, Disp: , Rfl:     bicalutamide (CASODEX) 50 mg tablet, Take 1 tablet by mouth Daily, Disp: , Rfl:     Blood Glucose Monitoring Suppl (ONE TOUCH ULTRA 2) w/Device KIT, by Does not apply route, Disp: , Rfl:     famotidine (PEPCID) 20 mg tablet, Take 1 tablet by mouth 2 (two) times a day, Disp: , Rfl:     glucose blood (ONE TOUCH ULTRA TEST) test strip, Use as instructed, Disp: 100 each, Rfl: 0    glucose blood (ONE TOUCH ULTRA TEST) test strip, One Touch Ultra  TEST 4 TIMES DAILY  Diagnosis Code E11 65, Disp: 400 each, Rfl: 5    hydrochlorothiazide (HYDRODIURIL) 12 5 mg tablet, Take 1 tablet by mouth daily, Disp: , Rfl:     insulin aspart (NOVOLOG FLEXPEN) 100 Units/mL injection pen, Inject 24 units with breakfast, 26 units with lunch and 24 units with dinner, Disp: 25 pen, Rfl: 1    Insulin Pen Needle 32G X 4 MM MISC, Using 4 times daily as directed, Disp: 360 each, Rfl: 1    lisinopril (ZESTRIL) 2 5 mg tablet, Take 1 tablet by mouth daily, Disp: , Rfl:     metoprolol tartrate (LOPRESSOR) 100 mg tablet, Take 1 tablet by mouth 2 (two) times a day, Disp: , Rfl:     ONE TOUCH LANCETS MISC, TEST BLOOD SUGAR 4 TIMES DAILY E11 65, Disp: 400 each, Rfl: 2    prasugrel (EFFIENT) tablet, Take 1 tablet by mouth daily, Disp: , Rfl:     TRESIBA FLEXTOUCH 200 units/mL CONCENTRATED U-200 injection pen, Injecting 70 units daily as directed Dx: E11 65, Disp: 11 pen, Rfl: 1  No Known Allergies    Review of Systems:  Review of Systems   Constitutional: Negative  HENT: Negative  Eyes: Negative  Respiratory: Positive for shortness of breath (with walking )  Uses nebulizer, he has stopped using all other inhalers, stating they don't work  O2 sat when he ambulates is low, stated his doctor is working on it  Sat after few mions up to 88%  On 6 liters continuously   Cardiovascular: Negative  Gastrointestinal: Negative  Endocrine:        Hot flashes  Genitourinary:        Nocturia x 1   Musculoskeletal: Positive for gait problem (had a fall the other day, and now has a cane  )  Allergic/Immunologic: Negative  Hematological: Bruises/bleeds easily  Vitals:    08/28/18 0900   BP: 102/50   BP Location: Right arm   Pulse: 62   Resp: 18   Temp: (!) 97 3 °F (36 3 °C)   SpO2: (!) 88%   Weight: 98 8 kg (217 lb 12 8 oz)   Height: 5' 5" (1 651 m)            Imaging:No results found

## 2018-09-13 DIAGNOSIS — E11.65 UNCONTROLLED TYPE 2 DIABETES MELLITUS WITH COMPLICATION, UNSPECIFIED WHETHER LONG TERM INSULIN USE: ICD-10-CM

## 2018-09-13 DIAGNOSIS — E11.8 UNCONTROLLED TYPE 2 DIABETES MELLITUS WITH COMPLICATION, UNSPECIFIED WHETHER LONG TERM INSULIN USE: ICD-10-CM

## 2018-09-13 RX ORDER — INSULIN DEGLUDEC 200 U/ML
INJECTION, SOLUTION SUBCUTANEOUS
Qty: 11 PEN | Refills: 1 | Status: SHIPPED | OUTPATIENT
Start: 2018-09-13 | End: 2019-08-15

## 2018-10-31 DIAGNOSIS — E11.649 UNCONTROLLED TYPE 2 DIABETES MELLITUS WITH HYPOGLYCEMIA, UNSPECIFIED HYPOGLYCEMIA COMA STATUS (HCC): Primary | ICD-10-CM

## 2018-11-28 ENCOUNTER — APPOINTMENT (OUTPATIENT)
Dept: LAB | Facility: MEDICAL CENTER | Age: 68
End: 2018-11-28
Payer: MEDICARE

## 2018-11-28 DIAGNOSIS — E11.65 TYPE 2 DIABETES MELLITUS WITH HYPERGLYCEMIA (HCC): ICD-10-CM

## 2018-11-28 DIAGNOSIS — E11.65 UNCONTROLLED TYPE 2 DIABETES MELLITUS WITH HYPERGLYCEMIA, WITH LONG-TERM CURRENT USE OF INSULIN (HCC): ICD-10-CM

## 2018-11-28 DIAGNOSIS — C61 MALIGNANT NEOPLASM OF PROSTATE (HCC): ICD-10-CM

## 2018-11-28 DIAGNOSIS — E78.5 HYPERLIPIDEMIA: ICD-10-CM

## 2018-11-28 DIAGNOSIS — I10 ESSENTIAL (PRIMARY) HYPERTENSION: ICD-10-CM

## 2018-11-28 DIAGNOSIS — C61 PROSTATE CANCER (HCC): ICD-10-CM

## 2018-11-28 DIAGNOSIS — Z79.4 UNCONTROLLED TYPE 2 DIABETES MELLITUS WITH HYPERGLYCEMIA, WITH LONG-TERM CURRENT USE OF INSULIN (HCC): ICD-10-CM

## 2018-11-28 LAB
ALBUMIN SERPL BCP-MCNC: 3.2 G/DL (ref 3.5–5)
ALP SERPL-CCNC: 105 U/L (ref 46–116)
ALT SERPL W P-5'-P-CCNC: 27 U/L (ref 12–78)
ANION GAP SERPL CALCULATED.3IONS-SCNC: 7 MMOL/L (ref 4–13)
AST SERPL W P-5'-P-CCNC: 26 U/L (ref 5–45)
BILIRUB SERPL-MCNC: 0.53 MG/DL (ref 0.2–1)
BUN SERPL-MCNC: 13 MG/DL (ref 5–25)
CALCIUM SERPL-MCNC: 9.1 MG/DL (ref 8.3–10.1)
CHLORIDE SERPL-SCNC: 99 MMOL/L (ref 100–108)
CO2 SERPL-SCNC: 31 MMOL/L (ref 21–32)
CREAT SERPL-MCNC: 0.79 MG/DL (ref 0.6–1.3)
EST. AVERAGE GLUCOSE BLD GHB EST-MCNC: 177 MG/DL
GFR SERPL CREATININE-BSD FRML MDRD: 91 ML/MIN/1.73SQ M
GLUCOSE SERPL-MCNC: 74 MG/DL (ref 65–140)
HBA1C MFR BLD: 7.8 % (ref 4.2–6.3)
POTASSIUM SERPL-SCNC: 3.6 MMOL/L (ref 3.5–5.3)
PROT SERPL-MCNC: 7.4 G/DL (ref 6.4–8.2)
SODIUM SERPL-SCNC: 137 MMOL/L (ref 136–145)

## 2018-11-28 PROCEDURE — 84153 ASSAY OF PSA TOTAL: CPT

## 2018-11-28 PROCEDURE — 83036 HEMOGLOBIN GLYCOSYLATED A1C: CPT

## 2018-11-28 PROCEDURE — 36415 COLL VENOUS BLD VENIPUNCTURE: CPT

## 2018-11-28 PROCEDURE — 82043 UR ALBUMIN QUANTITATIVE: CPT

## 2018-11-28 PROCEDURE — 80053 COMPREHEN METABOLIC PANEL: CPT

## 2018-11-28 PROCEDURE — 82570 ASSAY OF URINE CREATININE: CPT

## 2018-11-29 LAB
CREAT UR-MCNC: 196 MG/DL
MICROALBUMIN UR-MCNC: 22.6 MG/L (ref 0–20)
MICROALBUMIN/CREAT 24H UR: 12 MG/G CREATININE (ref 0–30)

## 2018-11-30 LAB — PSA SERPL-MCNC: <0.1 NG/ML (ref 0–4)

## 2018-12-11 ENCOUNTER — TELEPHONE (OUTPATIENT)
Dept: UROLOGY | Facility: AMBULATORY SURGERY CENTER | Age: 68
End: 2018-12-11

## 2018-12-11 NOTE — TELEPHONE ENCOUNTER
Attempted reach patient to schedule for LUPRON injection  Phone rang 10 times and went to message stating " person is unavailable to try calling later"   Unable to leave message for patient   Will try calling before 4pm

## 2018-12-11 NOTE — TELEPHONE ENCOUNTER
Patient canceled appointment for 11/29 and rescheduled to 1/21 but per nurse navigator is due for Lupron injection now, please review and arrange  Thank you

## 2018-12-12 NOTE — TELEPHONE ENCOUNTER
As per Dr Mary Anne Levin, patient is able to come to office as a nurse visit to receive LUPRON injection   Will call patient and schedule

## 2018-12-12 NOTE — TELEPHONE ENCOUNTER
Waiting for patient to call office to schedule Lupron injection   Will schedule patient once he calls back

## 2018-12-13 NOTE — TELEPHONE ENCOUNTER
Called and spoke with patient  He will come to office tomorrow 12/14 around 12:30 - 1pm to receive Lupron injection  Gabriela [place] can you please place Lupron order  Thank you !

## 2018-12-14 ENCOUNTER — PROCEDURE VISIT (OUTPATIENT)
Dept: UROLOGY | Facility: AMBULATORY SURGERY CENTER | Age: 68
End: 2018-12-14
Payer: MEDICARE

## 2018-12-14 VITALS
SYSTOLIC BLOOD PRESSURE: 90 MMHG | WEIGHT: 225 LBS | BODY MASS INDEX: 37.49 KG/M2 | HEART RATE: 80 BPM | DIASTOLIC BLOOD PRESSURE: 60 MMHG | HEIGHT: 65 IN

## 2018-12-14 DIAGNOSIS — C61 PROSTATE CA (HCC): Primary | ICD-10-CM

## 2018-12-14 DIAGNOSIS — C61 PROSTATE CANCER (HCC): Primary | ICD-10-CM

## 2018-12-14 NOTE — PROGRESS NOTES
12/14/2018  Mamta Orozco is a 77 y o  male  [id number]    Diagnosis:  Chief Complaint     Prostate Cancer          Patient presents for Lupron injection managed by Dr Kandace Crespo:  F/u as scheduled      Medication administration:      Administrations This Visit     leuprolide (LUPRON DEPOT 6 MONTH KIT) IM injection kit 45 mg     Admin Date  12/14/2018 Action  Given Dose  45 mg Route  Intramuscular Administered By  Christopher Paula                Vitals:    12/14/18 1302   BP: 90/60   Pulse: 80   Weight: 102 kg (225 lb)   Height: 5' 5" (1 651 m)         [place]

## 2019-02-05 ENCOUNTER — APPOINTMENT (OUTPATIENT)
Dept: LAB | Facility: MEDICAL CENTER | Age: 69
End: 2019-02-05
Payer: MEDICARE

## 2019-02-05 ENCOUNTER — TRANSCRIBE ORDERS (OUTPATIENT)
Dept: ADMINISTRATIVE | Facility: HOSPITAL | Age: 69
End: 2019-02-05

## 2019-02-05 DIAGNOSIS — I25.119 ATHEROSCLEROSIS OF NATIVE CORONARY ARTERY WITH ANGINA PECTORIS, UNSPECIFIED WHETHER NATIVE OR TRANSPLANTED HEART (HCC): ICD-10-CM

## 2019-02-05 DIAGNOSIS — E11.9 DIABETES MELLITUS WITHOUT COMPLICATION (HCC): Primary | ICD-10-CM

## 2019-02-05 DIAGNOSIS — I10 ESSENTIAL HYPERTENSION, BENIGN: ICD-10-CM

## 2019-02-05 LAB
ALBUMIN SERPL BCP-MCNC: 3.4 G/DL (ref 3.5–5)
ALP SERPL-CCNC: 97 U/L (ref 46–116)
ALT SERPL W P-5'-P-CCNC: 23 U/L (ref 12–78)
ANION GAP SERPL CALCULATED.3IONS-SCNC: 5 MMOL/L (ref 4–13)
AST SERPL W P-5'-P-CCNC: 13 U/L (ref 5–45)
BILIRUB SERPL-MCNC: 0.48 MG/DL (ref 0.2–1)
BUN SERPL-MCNC: 13 MG/DL (ref 5–25)
CALCIUM SERPL-MCNC: 9.1 MG/DL (ref 8.3–10.1)
CHLORIDE SERPL-SCNC: 99 MMOL/L (ref 100–108)
CHOLEST SERPL-MCNC: 120 MG/DL (ref 50–200)
CO2 SERPL-SCNC: 30 MMOL/L (ref 21–32)
CREAT SERPL-MCNC: 0.81 MG/DL (ref 0.6–1.3)
EST. AVERAGE GLUCOSE BLD GHB EST-MCNC: 163 MG/DL
GFR SERPL CREATININE-BSD FRML MDRD: 90 ML/MIN/1.73SQ M
GLUCOSE P FAST SERPL-MCNC: 57 MG/DL (ref 65–99)
HBA1C MFR BLD: 7.3 % (ref 4.2–6.3)
HDLC SERPL-MCNC: 43 MG/DL (ref 40–60)
LDLC SERPL CALC-MCNC: 50 MG/DL (ref 0–100)
LDLC SERPL DIRECT ASSAY-MCNC: 70 MG/DL (ref 0–100)
NONHDLC SERPL-MCNC: 77 MG/DL
POTASSIUM SERPL-SCNC: 3.7 MMOL/L (ref 3.5–5.3)
PROT SERPL-MCNC: 7.7 G/DL (ref 6.4–8.2)
SODIUM SERPL-SCNC: 134 MMOL/L (ref 136–145)
TRIGL SERPL-MCNC: 133 MG/DL

## 2019-02-05 PROCEDURE — 80061 LIPID PANEL: CPT | Performed by: FAMILY MEDICINE

## 2019-02-05 PROCEDURE — 83721 ASSAY OF BLOOD LIPOPROTEIN: CPT | Performed by: FAMILY MEDICINE

## 2019-02-05 PROCEDURE — 83036 HEMOGLOBIN GLYCOSYLATED A1C: CPT | Performed by: FAMILY MEDICINE

## 2019-02-05 PROCEDURE — 36415 COLL VENOUS BLD VENIPUNCTURE: CPT | Performed by: FAMILY MEDICINE

## 2019-02-05 PROCEDURE — 80053 COMPREHEN METABOLIC PANEL: CPT | Performed by: FAMILY MEDICINE

## 2019-02-11 DIAGNOSIS — E11.649 UNCONTROLLED TYPE 2 DIABETES MELLITUS WITH HYPOGLYCEMIA, UNSPECIFIED HYPOGLYCEMIA COMA STATUS (HCC): ICD-10-CM

## 2019-02-25 ENCOUNTER — TELEPHONE (OUTPATIENT)
Dept: ENDOCRINOLOGY | Facility: CLINIC | Age: 69
End: 2019-02-25

## 2019-02-26 DIAGNOSIS — IMO0002 UNCONTROLLED TYPE 2 DIABETES MELLITUS WITH COMPLICATION, WITH LONG-TERM CURRENT USE OF INSULIN: ICD-10-CM

## 2019-02-26 RX ORDER — INSULIN ASPART 100 [IU]/ML
INJECTION, SOLUTION INTRAVENOUS; SUBCUTANEOUS
Qty: 25 PEN | Refills: 1 | Status: SHIPPED | OUTPATIENT
Start: 2019-02-26 | End: 2019-08-15 | Stop reason: SDUPTHER

## 2019-02-28 DIAGNOSIS — C61 MALIGNANT NEOPLASM OF PROSTATE (HCC): Primary | ICD-10-CM

## 2019-03-16 DIAGNOSIS — E11.65 TYPE 2 DIABETES MELLITUS WITH HYPERGLYCEMIA (HCC): ICD-10-CM

## 2019-03-18 RX ORDER — LANCETS 33 GAUGE
EACH MISCELLANEOUS
Qty: 300 EACH | Refills: 1 | Status: SHIPPED | OUTPATIENT
Start: 2019-03-18 | End: 2019-10-22 | Stop reason: SDUPTHER

## 2019-03-19 ENCOUNTER — TELEPHONE (OUTPATIENT)
Dept: ENDOCRINOLOGY | Facility: CLINIC | Age: 69
End: 2019-03-19

## 2019-03-19 NOTE — TELEPHONE ENCOUNTER
Patient needs the "needles that test your sugars" sent to the [address] [place] [address] in [place]  His number is [phone] if you need to speak with him  Thank you

## 2019-03-20 ENCOUNTER — TELEPHONE (OUTPATIENT)
Dept: ENDOCRINOLOGY | Facility: CLINIC | Age: 69
End: 2019-03-20

## 2019-03-28 ENCOUNTER — CLINICAL SUPPORT (OUTPATIENT)
Dept: RADIATION ONCOLOGY | Facility: HOSPITAL | Age: 69
End: 2019-03-28
Attending: RADIOLOGY
Payer: MEDICARE

## 2019-03-28 VITALS
SYSTOLIC BLOOD PRESSURE: 110 MMHG | RESPIRATION RATE: 20 BRPM | DIASTOLIC BLOOD PRESSURE: 66 MMHG | TEMPERATURE: 97.4 F | HEART RATE: 64 BPM | WEIGHT: 229.2 LBS | OXYGEN SATURATION: 96 % | BODY MASS INDEX: 38.14 KG/M2

## 2019-03-28 DIAGNOSIS — C61 PROSTATE CANCER (HCC): Primary | ICD-10-CM

## 2019-03-28 PROCEDURE — 99214 OFFICE O/P EST MOD 30 MIN: CPT | Performed by: RADIOLOGY

## 2019-03-28 NOTE — PROGRESS NOTES
Follow-up - Radiation Oncology   Kalyan Medina  1950 77 y o  male [id number]      History of Present Illness   Cancer Staging  No matching staging information was found for the patient  Stage II [place] 8 adenocarcinoma prostate  Kalyan Medina is a 77y o  year old male with a history of IMRT plus ADT for [place] 8 adenocarcinoma prostate which he completed January of last year  He continues Lupron will be a total of 2 years  Interval History:  He is doing well without much in the way of bladder or urinary complaints  His last PSA November of 2018 was less than 0 1  He returns today for six-month follow-up          Historical Information      Prostate cancer ([place] [address] )    9/14/2017 Initial Diagnosis     Prostate cancer ([place] [address] )         9/14/2017 Biopsy     [place] score 8         10/18/2017 -  Hormone Therapy      Bicalutamide 50 MG Oral Tablet; TAKE 1 TABLET DAILY   11/2/17 Lupron Depot (6-Month) 45 MG Intramuscular Kit  5/15/18 (6 month ) Lupron injection           11/21/2017 - 1/24/2018 Radiation     Treatments:  Course: C1    Plan ID Energy Fractions Dose per Fraction (cGy) Total Dose Delivered (cGy) Elapsed Days   Prostate_SV 6X 44 / 44 180 7,920 64      Treatment dates:  11/21/2017 - 1/24/2018              Past Medical History:   Diagnosis Date    COPD (chronic obstructive pulmonary disease) ([place] [address] )     Diabetes mellitus ([place] [address] )     Malignant neoplasm of prostate ([place] [address] )      Past Surgical History:   Procedure Laterality Date    CARDIAC SURGERY         Social History   Social History     Substance and Sexual Activity   Alcohol Use No     Social History     Substance and Sexual Activity   Drug Use No     Social History     Tobacco Use   Smoking Status Former Smoker   Smokeless Tobacco Never Used         Meds/Allergies     Current Outpatient Medications:     aspirin 325 mg tablet, Take 1 tablet by mouth daily, Disp: , Rfl:     atorvastatin (LIPITOR) 40 mg tablet, Take 1 tablet by mouth daily, Disp: , Rfl:     bicalutamide (CASODEX) 50 mg tablet, Take 1 tablet by mouth Daily, Disp: , Rfl:     Blood Glucose Monitoring Suppl (ONE TOUCH ULTRA 2) w/Device KIT, by Does not apply route, Disp: , Rfl:     famotidine (PEPCID) 20 mg tablet, Take 1 tablet by mouth 2 (two) times a day, Disp: , Rfl:     glucose blood (ONE TOUCH ULTRA TEST) test strip, Use as instructed, Disp: 100 each, Rfl: 0    glucose blood (ONE TOUCH ULTRA TEST) test strip, One Touch Ultra  TEST 4 TIMES DAILY  Diagnosis Code E11 65, Disp: 400 each, Rfl: 5    hydrochlorothiazide (HYDRODIURIL) 12 5 mg tablet, Take 1 tablet by mouth daily, Disp: , Rfl:     Insulin Glargine (TOUJEO SOLOSTAR) 300 units/mL CONCETRATED U-300 injection pen, Inject 70 Units under the skin daily, Disp: 5 pen, Rfl: 2    Insulin Pen Needle 32G X 4 MM MISC, Using 4 times daily as directed, Disp: 360 each, Rfl: 1    lisinopril (ZESTRIL) 2 5 mg tablet, Take 1 tablet by mouth daily, Disp: , Rfl:     metoprolol tartrate (LOPRESSOR) 100 mg tablet, Take 1 tablet by mouth 2 (two) times a day, Disp: , Rfl:     NOVOLOG FLEXPEN 100 units/mL injection pen, Inject 24 units with breakfast, 26 units with lunch and 24 units with dinner, Disp: 25 pen, Rfl: 1    ONETOUCH DELICA LANCETS 12R MISC, test blood sugar four times a day, Disp: 300 each, Rfl: 1    prasugrel (EFFIENT) tablet, Take 1 tablet by mouth daily, Disp: , Rfl:     TRESIBA FLEXTOUCH 200 units/mL CONCENTRATED U-200 injection pen, Injecting 70 units daily as directed Dx: E11 65, Disp: 11 pen, Rfl: 1  No Known Allergies      Review of Systems   Constitutional: Negative  HENT: Negative  Eyes: Negative  Respiratory: Positive for shortness of breath  Cardiovascular: Negative  Gastrointestinal: Negative  Endocrine: Negative  Genitourinary: Negative  Musculoskeletal: Negative  Skin: Negative  Allergic/Immunologic: Negative  Neurological: Negative  Hematological: Negative  Psychiatric/Behavioral: Negative  OBJECTIVE:   /66 (BP Location: Left arm)   Pulse 64   Temp (!) 97 4 °F (36 3 °C) (Temporal)   Resp 20   Wt 104 kg (229 lb 3 2 oz)   SpO2 96%   BMI 38 14 kg/m²   Pain Assessment:  0  Karn[place]sky: 90 - Able to carry on normal activity; minor signs or symptoms of disease     Physical Exam   Constitutional: He is oriented to person, place, and time  He appears well-developed and well-nourished  HENT:   Head: Atraumatic  Eyes: Pupils are equal, round, and reactive to light  Conjunctivae and EOM are normal    Neck: Normal range of motion  Neck supple  Cardiovascular: Normal rate, regular rhythm and normal heart sounds  Pulmonary/Chest: Effort normal    Diminished bilateral breath sounds  Abdominal: Soft  He exhibits no mass  Genitourinary: Prostate normal    Musculoskeletal: He exhibits no edema  Lymphadenopathy:     He has no cervical adenopathy  Neurological: He is alert and oriented to person, place, and time  Psychiatric: He has a normal mood and affect  RESULTS    Lab Results: No results found for this or any previous visit (from the past 672 hour(s))  Imaging Studies:No results found  Assessment/Plan:  No orders of the defined types were placed in this encounter  Eliza Nichols is a 77y o  year old male with [place] 8 adenocarcinoma prostate treated with IMRT over a year ago and continues with ADT  We asked to see him again in 1 year  Angus Primrose, MD  3/28/2019,10:42 AM    Portions of the record may have been created with voice recognition software   Occasional wrong word or "sound a like" substitutions may have occurred due to the inherent limitations of voice recognition software   Read the chart carefully and recognize, using context, where substitutions have occurred

## 2019-03-28 NOTE — PROGRESS NOTES
Chris Smith    1950   Mr Duarte Leger is a 77 y o  male       Chief Complaint   Patient presents with    Follow-up     Radiation Oncology       Cancer Staging  No matching staging information was found for the patient  Oncology History    Patient returns for 6 month follow-up  He completed prostate radiation on 1/24/18  Last seen 8/28/18     77year old male with [place] 8 adenocarcinoma of the prostate s/p IMRT on 1/24/18 and ADT with Lupron to continue for 2 years, first injection on 10/18/17  Component      Latest Ref Rng & Units 5/2/2018 7/10/2018 11/28/2018   PSA, Total      0 0 - 4 0 ng/mL <0 1 <0 1 <0 1       12/14/18 Lupron injection at the urology office     6/14/19 Urology follow-up with TYRONE Fajardo            Prostate cancer [facility])    9/14/2017 Initial Diagnosis     Prostate cancer ([facility] )         9/14/2017 Biopsy     Reggie score 8         10/18/2017 -  Hormone Therapy      Bicalutamide 50 MG Oral Tablet; TAKE 1 TABLET DAILY   11/2/17 Lupron Depot (6-Month) 45 MG Intramuscular Kit  5/15/18 (6 month ) Lupron injection           11/21/2017 - 1/24/2018 Radiation     Treatments:  Course: C1    Plan ID Energy Fractions Dose per Fraction (cGy) Total Dose Delivered (cGy) Elapsed Days   Prostate_SV 6X 44 / 44 180 7,920 64      Treatment dates:  11/21/2017 - 1/24/2018              Clinical Trial: No    Screening  Tobacco  Current tobacco user: no  If yes, brief counseling provided: No    Hypertension  Hypertension screening performed: yes  Normotensive:  yes  If no, referred to PCP: no    Depression Screening  Screened for depression using PHQ-2: yes    Screened for depression using PHQ-9:  no  Screening positive or negative:  negative  If score >4, was any of the following actions taken?    Additional evaluation for depression, suicide risk assesment, referral to PCP or psychiatry, medication started:  no    Advanced Care Planning for Patients >65 years  Advanced Care Planning Discussed: yes  Patient named surrogate decision maker or care plan in chart: no    Health Maintenance   Topic Date Due    Hepatitis C Screening  1950   [employer] Medicare Annual Wellness Visit (AWV)  1950    CRC Screening: Colonoscopy  1950    BMI: Followup Plan  03/31/1966    HEPATITIS B VACCINES (1 of 3 - Risk 3-dose series) 03/31/1967    Fall Risk  03/31/2013    Pneumococcal PPSV23/PCV13 65+ Years / High and Highest Risk (2 of 2 - PPSV23) 08/24/2016    DM Eye Exam  03/09/2017    Diabetic Foot Exam  03/22/2018    HEMOGLOBIN A1C  08/05/2019    Depression Screening PHQ  08/28/2019    URINE MICROALBUMIN  11/28/2019    BMI: Adult  12/14/2019    DTaP,Tdap,and Td Vaccines (2 - Td) 12/25/2027    INFLUENZA VACCINE  Completed       Patient Active Problem List   Diagnosis    Prostate cancer ([facility] )    Uncontrolled type 2 diabetes mellitus, with long-term current use of insulin ([facility] )    Hypertension    Hyperlipidemia    Hypoglycemia     Past Medical History:   Diagnosis Date    [facility]     Diabetes mellitus ([facility] )     Malignant neoplasm of prostate ([facility] )      Past Surgical History:   Procedure Laterality Date    CARDIAC SURGERY       History reviewed  No pertinent family history    Social History     Socioeconomic History    Marital status: Single     Spouse name: Not on file    Number of children: Not on file    Years of education: Not on file    Highest education level: Not on file   Occupational History    Not on file   Social Needs    Financial resource strain: Not on file    Food insecurity:     Worry: Not on file     Inability: Not on file    Transportation needs:     Medical: Not on file     Non-medical: Not on file   Tobacco Use    Smoking status: Former Smoker    Smokeless tobacco: Never Used   Substance and Sexual Activity    Alcohol use: No    Drug use: No    Sexual activity: Not on file   Lifestyle    Physical activity:     Days per week: Not on file     Minutes per session: Not on file    Stress: Not on file   Relationships    Social connections:     Talks on phone: Not on file     Gets together: Not on file     Attends Anabaptism service: Not on file     Active member of club or organization: Not on file     Attends meetings of clubs or organizations: Not on file     Relationship status: Not on file    Intimate partner violence:     Fear of current or ex partner: Not on file     Emotionally abused: Not on file     Physically abused: Not on file     Forced sexual activity: Not on file   Other Topics Concern    Not on file   Social History Narrative    Not on file       Current Outpatient Medications:     aspirin 325 mg tablet, Take 1 tablet by mouth daily, Disp: , Rfl:     atorvastatin (LIPITOR) 40 mg tablet, Take 1 tablet by mouth daily, Disp: , Rfl:     bicalutamide (CASODEX) 50 mg tablet, Take 1 tablet by mouth Daily, Disp: , Rfl:     Blood Glucose Monitoring Suppl (ONE TOUCH ULTRA 2) w/Device KIT, by Does not apply route, Disp: , Rfl:     famotidine (PEPCID) 20 mg tablet, Take 1 tablet by mouth 2 (two) times a day, Disp: , Rfl:     glucose blood (ONE TOUCH ULTRA TEST) test strip, Use as instructed, Disp: 100 each, Rfl: 0    glucose blood (ONE TOUCH ULTRA TEST) test strip, One Touch Ultra  TEST 4 TIMES DAILY  Diagnosis Code E11 65, Disp: 400 each, Rfl: 5    hydrochlorothiazide (HYDRODIURIL) 12 5 mg tablet, Take 1 tablet by mouth daily, Disp: , Rfl:     Insulin Glargine (TOUJEO SOLOSTAR) 300 units/mL CONCETRATED U-300 injection pen, Inject 70 Units under the skin daily, Disp: 5 pen, Rfl: 2    Insulin Pen Needle 32G X 4 MM MISC, Using 4 times daily as directed, Disp: 360 each, Rfl: 1    lisinopril (ZESTRIL) 2 5 mg tablet, Take 1 tablet by mouth daily, Disp: , Rfl:     metoprolol tartrate (LOPRESSOR) 100 mg tablet, Take 1 tablet by mouth 2 (two) times a day, Disp: , Rfl:     NOVOLOG FLEXPEN 100 units/mL injection pen, Inject 24 units with breakfast, 26 units with lunch and 24 units with dinner, Disp: 25 pen, Rfl: 1    ONETOUCH DELICA LANCETS 20O MISC, test blood sugar four times a day, Disp: 300 each, Rfl: 1    prasugrel (EFFIENT) tablet, Take 1 tablet by mouth daily, Disp: , Rfl:     TRESIBA FLEXTOUCH 200 units/mL CONCENTRATED U-200 injection pen, Injecting 70 units daily as directed Dx: E11 65, Disp: 11 pen, Rfl: 1  No Known Allergies    Review of Systems:  Review of Systems   HENT: Negative  Eyes: Negative  Respiratory: Positive for shortness of breath  Oxygen NC 5L continuous   Cardiovascular: Negative  Gastrointestinal: Negative  Endocrine: Negative  Genitourinary: Positive for frequency (nocturia x1)  Musculoskeletal: Positive for arthralgias  Skin: Negative  Allergic/Immunologic: Negative  Neurological: Negative  Hematological: Negative  Psychiatric/Behavioral: Negative  Vitals:    03/28/19 0900   BP: 110/66   BP Location: Left arm   Pulse: 64   Resp: 20   Temp: (!) 97 4 °F (36 3 °C)   TempSrc: Temporal   SpO2: 96%   Weight: 104 kg (229 lb 3 2 oz)            Imaging:No results found  normal (ped)...

## 2019-04-10 ENCOUNTER — OFFICE VISIT (OUTPATIENT)
Dept: ENDOCRINOLOGY | Facility: CLINIC | Age: 69
End: 2019-04-10
Payer: MEDICARE

## 2019-04-10 VITALS
WEIGHT: 230 LBS | BODY MASS INDEX: 38.32 KG/M2 | HEIGHT: 65 IN | SYSTOLIC BLOOD PRESSURE: 110 MMHG | HEART RATE: 70 BPM | DIASTOLIC BLOOD PRESSURE: 60 MMHG

## 2019-04-10 DIAGNOSIS — I10 HYPERTENSION, UNSPECIFIED TYPE: ICD-10-CM

## 2019-04-10 DIAGNOSIS — E16.2 HYPOGLYCEMIA: ICD-10-CM

## 2019-04-10 DIAGNOSIS — E11.649 UNCONTROLLED TYPE 2 DIABETES MELLITUS WITH HYPOGLYCEMIA, UNSPECIFIED HYPOGLYCEMIA COMA STATUS (HCC): ICD-10-CM

## 2019-04-10 DIAGNOSIS — IMO0002 UNCONTROLLED TYPE 2 DIABETES MELLITUS, WITH LONG-TERM CURRENT USE OF INSULIN: Primary | ICD-10-CM

## 2019-04-10 DIAGNOSIS — E78.5 HYPERLIPIDEMIA, UNSPECIFIED HYPERLIPIDEMIA TYPE: ICD-10-CM

## 2019-04-10 PROCEDURE — 99214 OFFICE O/P EST MOD 30 MIN: CPT | Performed by: PHYSICIAN ASSISTANT

## 2019-04-26 DIAGNOSIS — IMO0002 UNCONTROLLED TYPE 2 DIABETES MELLITUS WITH COMPLICATION, WITH LONG-TERM CURRENT USE OF INSULIN: ICD-10-CM

## 2019-06-11 ENCOUNTER — APPOINTMENT (OUTPATIENT)
Dept: LAB | Facility: MEDICAL CENTER | Age: 69
End: 2019-06-11
Payer: MEDICARE

## 2019-06-11 DIAGNOSIS — IMO0002 UNCONTROLLED TYPE 2 DIABETES MELLITUS, WITH LONG-TERM CURRENT USE OF INSULIN: ICD-10-CM

## 2019-06-11 DIAGNOSIS — C61 MALIGNANT NEOPLASM OF PROSTATE (HCC): ICD-10-CM

## 2019-06-11 LAB
EST. AVERAGE GLUCOSE BLD GHB EST-MCNC: 169 MG/DL
HBA1C MFR BLD: 7.5 % (ref 4.2–6.3)
PSA SERPL-MCNC: <0.1 NG/ML (ref 0–4)

## 2019-06-11 PROCEDURE — 83036 HEMOGLOBIN GLYCOSYLATED A1C: CPT

## 2019-06-11 PROCEDURE — 36415 COLL VENOUS BLD VENIPUNCTURE: CPT

## 2019-06-11 PROCEDURE — 84153 ASSAY OF PSA TOTAL: CPT

## 2019-06-12 ENCOUNTER — TELEPHONE (OUTPATIENT)
Dept: ENDOCRINOLOGY | Facility: CLINIC | Age: 69
End: 2019-06-12

## 2019-06-14 ENCOUNTER — OFFICE VISIT (OUTPATIENT)
Dept: UROLOGY | Facility: AMBULATORY SURGERY CENTER | Age: 69
End: 2019-06-14
Payer: MEDICARE

## 2019-06-14 ENCOUNTER — TELEPHONE (OUTPATIENT)
Dept: UROLOGY | Facility: AMBULATORY SURGERY CENTER | Age: 69
End: 2019-06-14

## 2019-06-14 VITALS
WEIGHT: 224 LBS | SYSTOLIC BLOOD PRESSURE: 130 MMHG | HEIGHT: 65 IN | DIASTOLIC BLOOD PRESSURE: 80 MMHG | BODY MASS INDEX: 37.32 KG/M2 | HEART RATE: 75 BPM

## 2019-06-14 DIAGNOSIS — C61 PROSTATE CANCER (HCC): Primary | ICD-10-CM

## 2019-06-14 PROCEDURE — 99214 OFFICE O/P EST MOD 30 MIN: CPT | Performed by: NURSE PRACTITIONER

## 2019-06-14 PROCEDURE — 96402 CHEMO HORMON ANTINEOPL SQ/IM: CPT

## 2019-06-28 DIAGNOSIS — Z79.4 TYPE 2 DIABETES MELLITUS WITH COMPLICATION, WITH LONG-TERM CURRENT USE OF INSULIN (HCC): ICD-10-CM

## 2019-06-28 DIAGNOSIS — E11.8 TYPE 2 DIABETES MELLITUS WITH COMPLICATION, WITH LONG-TERM CURRENT USE OF INSULIN (HCC): ICD-10-CM

## 2019-07-08 DIAGNOSIS — E11.8 TYPE 2 DIABETES MELLITUS WITH COMPLICATION, WITH LONG-TERM CURRENT USE OF INSULIN (HCC): ICD-10-CM

## 2019-07-08 DIAGNOSIS — Z79.4 TYPE 2 DIABETES MELLITUS WITH COMPLICATION, WITH LONG-TERM CURRENT USE OF INSULIN (HCC): ICD-10-CM

## 2019-08-07 ENCOUNTER — TELEPHONE (OUTPATIENT)
Dept: ENDOCRINOLOGY | Facility: CLINIC | Age: 69
End: 2019-08-07

## 2019-08-07 NOTE — TELEPHONE ENCOUNTER
Spoke to pt was curious as to if he needed labs done before visit but I didn't see any in there ordered

## 2019-08-15 ENCOUNTER — OFFICE VISIT (OUTPATIENT)
Dept: ENDOCRINOLOGY | Facility: CLINIC | Age: 69
End: 2019-08-15
Payer: MEDICARE

## 2019-08-15 VITALS
HEIGHT: 65 IN | WEIGHT: 225.4 LBS | HEART RATE: 79 BPM | DIASTOLIC BLOOD PRESSURE: 60 MMHG | SYSTOLIC BLOOD PRESSURE: 120 MMHG | BODY MASS INDEX: 37.55 KG/M2

## 2019-08-15 DIAGNOSIS — E78.5 HYPERLIPIDEMIA, UNSPECIFIED HYPERLIPIDEMIA TYPE: ICD-10-CM

## 2019-08-15 DIAGNOSIS — IMO0002 UNCONTROLLED TYPE 2 DIABETES MELLITUS, WITH LONG-TERM CURRENT USE OF INSULIN: Primary | ICD-10-CM

## 2019-08-15 DIAGNOSIS — I10 HYPERTENSION, UNSPECIFIED TYPE: ICD-10-CM

## 2019-08-15 DIAGNOSIS — E11.649 UNCONTROLLED TYPE 2 DIABETES MELLITUS WITH HYPOGLYCEMIA, UNSPECIFIED HYPOGLYCEMIA COMA STATUS (HCC): ICD-10-CM

## 2019-08-15 DIAGNOSIS — IMO0002 UNCONTROLLED TYPE 2 DIABETES MELLITUS WITH COMPLICATION, WITH LONG-TERM CURRENT USE OF INSULIN: ICD-10-CM

## 2019-08-15 PROCEDURE — 99214 OFFICE O/P EST MOD 30 MIN: CPT | Performed by: INTERNAL MEDICINE

## 2019-08-15 RX ORDER — INSULIN ASPART 100 [IU]/ML
INJECTION, SOLUTION INTRAVENOUS; SUBCUTANEOUS
Qty: 25 PEN | Refills: 1 | Status: SHIPPED | OUTPATIENT
Start: 2019-08-15 | End: 2020-01-16 | Stop reason: SDUPTHER

## 2019-08-15 NOTE — PROGRESS NOTES
Aura Jiménez  68 y o  male MRN: [id number]    Encounter: [id number]      Assessment/Plan     Problem List Items Addressed This Visit        Endocrine    Uncontrolled type 2 diabetes mellitus, with long-term current use of insulin ([facility] ) - Primary     Lab Results   Component Value Date    HGBA1C 7 5 (H) 06/11/2019    Hemoglobin A1c almost at goal-sugars fairly stable-continue current regimen    No results for input(s): POCGLU in the last 72 hours  Blood Sugar Average: Last 72 hrs:           Relevant Medications    NOVOLOG FLEXPEN 100 units/mL injection pen    Other Relevant Orders    Comprehensive metabolic panel Lab Collect    HEMOGLOBIN A1C W/ EAG ESTIMATION Lab Collect       Cardiovascular and Mediastinum    Hypertension    Relevant Orders    Comprehensive metabolic panel Lab Collect    Microalbumin / creatinine urine ratio Lab Collect       Other    Hyperlipidemia     Continue statins         Relevant Orders    Comprehensive metabolic panel Lab Collect    Lipid panel Lab Collect Lab Collect      Other Visit Diagnoses     Uncontrolled type 2 diabetes mellitus with complication, with long-term current use of insulin (HCC)        Relevant Medications    NOVOLOG FLEXPEN 100 units/mL injection pen    Uncontrolled type 2 diabetes mellitus with hypoglycemia, unspecified hypoglycemia coma status (HCC)        Relevant Medications    NOVOLOG FLEXPEN 100 units/mL injection pen        CC: Diabetes    History of Present Illness     HPI:  40-year-old male with history of type 2 diabetes on long-term insulin therapy here for follow-up  Current insulin regimen-  NOVOLOG 26-24-26-0  TOUJEO 70 UNITS   No polyurea , polydypsia , no blurry vision , no numbness and tingling in feet     checks f s 3/day - fasting 120-160s  Rest of day 130-160s   no hypoglycemia    Review of Systems   Constitutional: Positive for fatigue  Negative for unexpected weight change  Eyes: Negative for visual disturbance     Respiratory: Positive for shortness of breath  Cardiovascular: Negative for palpitations and leg swelling  Gastrointestinal: Negative for constipation, diarrhea, nausea and vomiting  Skin: Negative for rash and wound  Psychiatric/Behavioral: Negative for sleep disturbance  All other systems reviewed and are negative  Historical Information   Past Medical History:   Diagnosis Date    COPD (chronic obstructive pulmonary disease) ([facility] )     Diabetes mellitus ([facility] )     Malignant neoplasm of prostate ([place] [address] )      Past Surgical History:   Procedure Laterality Date    CARDIAC SURGERY       Social History   Social History     Substance and Sexual Activity   Alcohol Use No     Social History     Substance and Sexual Activity   Drug Use No     Social History     Tobacco Use   Smoking Status Former Smoker   Smokeless Tobacco Never Used     Family History: History reviewed  No pertinent family history      Meds/Allergies   Current Outpatient Medications   Medication Sig Dispense Refill    aspirin 325 mg tablet Take 1 tablet by mouth daily      atorvastatin (LIPITOR) 40 mg tablet Take 1 tablet by mouth daily      bicalutamide (CASODEX) 50 mg tablet Take 1 tablet by mouth Daily      Blood Glucose Monitoring Suppl (ONE TOUCH ULTRA 2) w/Device KIT by Does not apply route      famotidine (PEPCID) 20 mg tablet Take 1 tablet by mouth 2 (two) times a day      glucose blood (ONE TOUCH ULTRA TEST) test strip Pt to test blood sugar 4 times a day 400 each 2    hydrochlorothiazide (HYDRODIURIL) 12 5 mg tablet Take 1 tablet by mouth daily      Insulin Glargine (TOUJEO SOLOSTAR) 300 units/mL CONCETRATED U-300 injection pen Inject 70 Units under the skin daily for 97 days 15 pen 1    Insulin Pen Needle 32G X 4 MM MISC Using 4 times daily as directed 360 each 1    lisinopril (ZESTRIL) 2 5 mg tablet Take 1 tablet by mouth daily      metoprolol tartrate (LOPRESSOR) 100 mg tablet Take 1 tablet by mouth 2 (two) times a day      NOVOLOG FLEXPEN 100 units/mL injection pen Inject 24 units with breakfast, 26 units with lunch and 24 units with dinner 25 pen 1    ONETOUCH DELICA LANCETS 40E MISC test blood sugar four times a day 300 each 1    prasugrel (EFFIENT) tablet Take 1 tablet by mouth daily       No current facility-administered medications for this visit  No Known Allergies    Objective   Vitals: Blood pressure 120/60, pulse 79, height 5' 5" (1 651 m), weight 102 kg (225 lb 6 4 oz)  Physical Exam   Constitutional: He is oriented to person, place, and time  He appears well-developed and well-nourished  No distress  HENT:   Head: Normocephalic and atraumatic  Eyes: EOM are normal  No scleral icterus  Neck: Normal range of motion  Neck supple  Cardiovascular: Normal rate, regular rhythm and normal heart sounds  No murmur heard  Pulmonary/Chest: Effort normal and breath sounds normal  No respiratory distress  He has no wheezes  He has no rales  Abdominal: Soft  Bowel sounds are normal  He exhibits no distension  There is no tenderness  There is no guarding  Musculoskeletal: Normal range of motion  He exhibits no edema or deformity  Lymphadenopathy:     He has no cervical adenopathy  Neurological: He is alert and oriented to person, place, and time  Skin: Skin is warm and dry  Psychiatric: He has a normal mood and affect  His behavior is normal  Judgment and thought content normal        The history was obtained from the review of the chart, patient      Lab Results:   Lab Results   Component Value Date/Time    Hemoglobin A1C 7 5 (H) 06/11/2019 11:00 AM    Hemoglobin A1C 7 3 (H) 02/05/2019 10:21 AM    Hemoglobin A1C 7 8 (H) 11/28/2018 12:13 PM    BUN 13 02/05/2019 10:21 AM    BUN 13 11/28/2018 12:13 PM    Potassium 3 7 02/05/2019 10:21 AM    Potassium 3 6 11/28/2018 12:13 PM    Chloride 99 (L) 02/05/2019 10:21 AM    Chloride 99 (L) 11/28/2018 12:13 PM    CO2 30 02/05/2019 10:21 AM    CO2 31 11/28/2018 12:13 PM Creatinine 0 81 02/05/2019 10:21 AM    Creatinine 0 79 11/28/2018 12:13 PM    AST 13 02/05/2019 10:21 AM    AST 26 11/28/2018 12:13 PM    ALT 23 02/05/2019 10:21 AM    ALT 27 11/28/2018 12:13 PM    Albumin 3 4 (L) 02/05/2019 10:21 AM    Albumin 3 2 (L) 11/28/2018 12:13 PM    HDL, Direct 43 02/05/2019 10:21 AM    Triglycerides 133 02/05/2019 10:21 AM           Portions of the record may have been created with voice recognition software  Occasional wrong word or "sound a like" substitutions may have occurred due to the inherent limitations of voice recognition software  Read the chart carefully and recognize, using context, where substitutions have occurred

## 2019-08-15 NOTE — ASSESSMENT & PLAN NOTE
Lab Results   Component Value Date    HGBA1C 7 5 (H) 06/11/2019    Hemoglobin A1c almost at goal-sugars fairly stable-continue current regimen    No results for input(s): POCGLU in the last 72 hours      Blood Sugar Average: Last 72 hrs:

## 2019-09-30 ENCOUNTER — TELEPHONE (OUTPATIENT)
Dept: ENDOCRINOLOGY | Facility: CLINIC | Age: 69
End: 2019-09-30

## 2019-09-30 DIAGNOSIS — E11.649 UNCONTROLLED TYPE 2 DIABETES MELLITUS WITH HYPOGLYCEMIA, UNSPECIFIED HYPOGLYCEMIA COMA STATUS (HCC): ICD-10-CM

## 2019-10-22 DIAGNOSIS — E11.65 TYPE 2 DIABETES MELLITUS WITH HYPERGLYCEMIA (HCC): ICD-10-CM

## 2019-10-22 RX ORDER — LANCETS 33 GAUGE
EACH MISCELLANEOUS
Qty: 300 EACH | Refills: 1 | Status: SHIPPED | OUTPATIENT
Start: 2019-10-22 | End: 2020-05-27 | Stop reason: SDUPTHER

## 2019-11-11 ENCOUNTER — LAB (OUTPATIENT)
Dept: LAB | Facility: MEDICAL CENTER | Age: 69
End: 2019-11-11
Payer: MEDICARE

## 2019-11-11 ENCOUNTER — TRANSCRIBE ORDERS (OUTPATIENT)
Dept: ADMINISTRATIVE | Facility: HOSPITAL | Age: 69
End: 2019-11-11

## 2019-11-11 DIAGNOSIS — E78.5 HYPERLIPIDEMIA, UNSPECIFIED HYPERLIPIDEMIA TYPE: ICD-10-CM

## 2019-11-11 DIAGNOSIS — I10 ESSENTIAL HYPERTENSION, BENIGN: ICD-10-CM

## 2019-11-11 DIAGNOSIS — IMO0002 UNCONTROLLED TYPE 2 DIABETES MELLITUS, WITH LONG-TERM CURRENT USE OF INSULIN: ICD-10-CM

## 2019-11-11 DIAGNOSIS — E11.9 DIABETES MELLITUS WITHOUT COMPLICATION (HCC): Primary | ICD-10-CM

## 2019-11-11 DIAGNOSIS — I10 HYPERTENSION, UNSPECIFIED TYPE: ICD-10-CM

## 2019-11-11 LAB
ALBUMIN SERPL BCP-MCNC: 3.4 G/DL (ref 3.5–5)
ALP SERPL-CCNC: 93 U/L (ref 46–116)
ALT SERPL W P-5'-P-CCNC: 24 U/L (ref 12–78)
ANION GAP SERPL CALCULATED.3IONS-SCNC: 7 MMOL/L (ref 4–13)
AST SERPL W P-5'-P-CCNC: 20 U/L (ref 5–45)
BILIRUB SERPL-MCNC: 0.91 MG/DL (ref 0.2–1)
BUN SERPL-MCNC: 12 MG/DL (ref 5–25)
CALCIUM SERPL-MCNC: 9.6 MG/DL (ref 8.3–10.1)
CHLORIDE SERPL-SCNC: 97 MMOL/L (ref 100–108)
CHOLEST SERPL-MCNC: 126 MG/DL (ref 50–200)
CO2 SERPL-SCNC: 32 MMOL/L (ref 21–32)
CREAT SERPL-MCNC: 0.96 MG/DL (ref 0.6–1.3)
CREAT UR-MCNC: 172 MG/DL
EST. AVERAGE GLUCOSE BLD GHB EST-MCNC: 169 MG/DL
GFR SERPL CREATININE-BSD FRML MDRD: 79 ML/MIN/1.73SQ M
GLUCOSE P FAST SERPL-MCNC: 142 MG/DL (ref 65–99)
HBA1C MFR BLD: 7.5 % (ref 4.2–6.3)
HDLC SERPL-MCNC: 44 MG/DL
LDLC SERPL CALC-MCNC: 57 MG/DL (ref 0–100)
LDLC SERPL DIRECT ASSAY-MCNC: 72 MG/DL (ref 0–100)
MICROALBUMIN UR-MCNC: 39.3 MG/L (ref 0–20)
MICROALBUMIN/CREAT 24H UR: 23 MG/G CREATININE (ref 0–30)
NONHDLC SERPL-MCNC: 82 MG/DL
POTASSIUM SERPL-SCNC: 3.7 MMOL/L (ref 3.5–5.3)
PROT SERPL-MCNC: 7.9 G/DL (ref 6.4–8.2)
SODIUM SERPL-SCNC: 136 MMOL/L (ref 136–145)
TRIGL SERPL-MCNC: 125 MG/DL

## 2019-11-11 PROCEDURE — 36415 COLL VENOUS BLD VENIPUNCTURE: CPT | Performed by: FAMILY MEDICINE

## 2019-11-11 PROCEDURE — 83036 HEMOGLOBIN GLYCOSYLATED A1C: CPT | Performed by: FAMILY MEDICINE

## 2019-11-11 PROCEDURE — 82570 ASSAY OF URINE CREATININE: CPT

## 2019-11-11 PROCEDURE — 80053 COMPREHEN METABOLIC PANEL: CPT | Performed by: FAMILY MEDICINE

## 2019-11-11 PROCEDURE — 83721 ASSAY OF BLOOD LIPOPROTEIN: CPT | Performed by: FAMILY MEDICINE

## 2019-11-11 PROCEDURE — 80061 LIPID PANEL: CPT | Performed by: FAMILY MEDICINE

## 2019-11-11 PROCEDURE — 82043 UR ALBUMIN QUANTITATIVE: CPT

## 2019-11-13 ENCOUNTER — TELEPHONE (OUTPATIENT)
Dept: ENDOCRINOLOGY | Facility: CLINIC | Age: 69
End: 2019-11-13

## 2019-11-13 NOTE — TELEPHONE ENCOUNTER
----- Message from Sandrine Jimenez MD sent at 11/12/2019  8:33 PM EST -----  Please call the patient regarding labs - A1C 7 5 ALMOST AT goal -labs otherwise ok- continue current meds

## 2019-11-13 NOTE — RESULT ENCOUNTER NOTE
Please call the patient regarding labs - A1C 7 5 ALMOST AT goal -labs otherwise ok- continue current meds

## 2019-12-13 ENCOUNTER — APPOINTMENT (OUTPATIENT)
Dept: LAB | Facility: MEDICAL CENTER | Age: 69
End: 2019-12-13
Payer: MEDICARE

## 2019-12-13 DIAGNOSIS — C61 PROSTATE CANCER (HCC): ICD-10-CM

## 2019-12-13 LAB — PSA SERPL-MCNC: <0.1 NG/ML (ref 0–4)

## 2019-12-13 PROCEDURE — 84153 ASSAY OF PSA TOTAL: CPT

## 2020-01-16 DIAGNOSIS — E11.649 UNCONTROLLED TYPE 2 DIABETES MELLITUS WITH HYPOGLYCEMIA, UNSPECIFIED HYPOGLYCEMIA COMA STATUS (HCC): ICD-10-CM

## 2020-01-16 DIAGNOSIS — IMO0002 UNCONTROLLED TYPE 2 DIABETES MELLITUS WITH COMPLICATION, WITH LONG-TERM CURRENT USE OF INSULIN: ICD-10-CM

## 2020-01-16 RX ORDER — INSULIN ASPART 100 [IU]/ML
INJECTION, SOLUTION INTRAVENOUS; SUBCUTANEOUS
Qty: 66 ML | Refills: 1 | Status: SHIPPED | OUTPATIENT
Start: 2020-01-16 | End: 2020-03-26 | Stop reason: DRUGHIGH

## 2020-01-21 DIAGNOSIS — E11.649 UNCONTROLLED TYPE 2 DIABETES MELLITUS WITH HYPOGLYCEMIA, UNSPECIFIED HYPOGLYCEMIA COMA STATUS (HCC): ICD-10-CM

## 2020-01-21 DIAGNOSIS — Z79.4 TYPE 2 DIABETES MELLITUS WITH COMPLICATION, WITH LONG-TERM CURRENT USE OF INSULIN (HCC): ICD-10-CM

## 2020-01-21 DIAGNOSIS — E11.8 TYPE 2 DIABETES MELLITUS WITH COMPLICATION, WITH LONG-TERM CURRENT USE OF INSULIN (HCC): ICD-10-CM

## 2020-02-14 ENCOUNTER — TELEPHONE (OUTPATIENT)
Dept: UROLOGY | Facility: CLINIC | Age: 70
End: 2020-02-14

## 2020-02-14 NOTE — TELEPHONE ENCOUNTER
Spoke with patient and he stated that his car broke down and it is very difficult to get rides at the moment  I asked if he would like a call back next week to see if his situation has gotten better so we can get him rescheduled and he said yes  Please check back next week with patient

## 2020-02-14 NOTE — TELEPHONE ENCOUNTER
Patient no showed for appointment on 2/3/20  Patient due for Lupron  Please contact patient to reschedule appointment, ok for appointment with AP

## 2020-02-21 NOTE — TELEPHONE ENCOUNTER
Called Pt and spoke to him  He is having his son calling us to set up his appt  He is his ride and then give us his correct #  He couldn't give it to me being on the phone    Please schedule him in the [facility] with Eliana Leon for his FU and Lupron when they call back

## 2020-03-05 ENCOUNTER — TELEPHONE (OUTPATIENT)
Dept: UROLOGY | Facility: MEDICAL CENTER | Age: 70
End: 2020-03-05

## 2020-03-05 NOTE — TELEPHONE ENCOUNTER
Provider Call    Patient of Rae Ganser seen at [facility]    Dr Alex Esqueda from [address] [facility] calling to discuss bicalutamide (CASODEX) 50 mg tablet  She would like to know if patient is to still be taking this      Dr Alex Esqueda can be reached at [phone]

## 2020-03-06 NOTE — TELEPHONE ENCOUNTER
Call placed to Dr Otto Rodríguez, left message to call office back  Office number provided on [employer]

## 2020-03-06 NOTE — TELEPHONE ENCOUNTER
Patient of [facility] office  History Reggie 8 prostate cancer s/p XRT (1/2018)  Last seen in office 6/14/19 at which time he received Lupron 45 mg  He was ordered to follow up in office in 6 months with PSA ptv and to have last Lupron injection  Patient missed appointment for 2/3/2020 in office  He has not yet rescheduled  Will route to provider to advise that Casodex is not necessary at this time  Will also route to clerical to reach out to patient to reschedule his appointment with AP

## 2020-03-06 NOTE — TELEPHONE ENCOUNTER
Casodex complete, patient should continue androgen deprivation therapy with Lupron injections every 6 months  Patient due for his last Lupron to complete 2 years of ADT  Please assist patient with rescheduling

## 2020-03-09 NOTE — TELEPHONE ENCOUNTER
Tried calling patient  Phone just kept ringing and then it said this wireless customer you are trying to call is not available please try the call again later  Will try back

## 2020-03-09 NOTE — TELEPHONE ENCOUNTER
Call placed to Dr Emily Bishop, advised that per Mustapha Rios  Casodex complete, patient should continue androgen deprivation therapy with Lupron injections every 6 months  Will route to clerical to call back to reschedule his recently missed appointment with AP for Lupron injection

## 2020-03-10 NOTE — TELEPHONE ENCOUNTER
Kandace Anderson from [address] [place] [address] ([phone]) called to verify if Casadex was to be continued  I relayed the previous determination that NO Casadex was to be discontinued, however, patient still due for one more Lupron 45mg injection to complete his 2 years of ADT  As we were having difficulty reaching the patient, I had Call Center schedule an office visit for the patient:  Monday 3/16/2020 @ 9am in the [place] location with Jacky Mason  Patient has straight Medicare so Lupron 45mg, if needed, is buy and bill for office inventory

## 2020-03-11 NOTE — TELEPHONE ENCOUNTER
2nd attempt to try and call patient  Dialed number it says The patient you are trying to reach is not accepting calls at this time, please try your call again later

## 2020-03-12 NOTE — TELEPHONE ENCOUNTER
Tried to place call to son, but that number is not active   When calling rehab, it goes directly to a busy signal

## 2020-03-18 NOTE — TELEPHONE ENCOUNTER
Called Aaron Munoz and explained that we scheduled his father for  4/16/20 @ 3:30  However explained that we are going on a week to week basis because of covid -19 He asked to send him email to [email or web address]

## 2020-03-18 NOTE — TELEPHONE ENCOUNTER
Received call from Daughter in [place], in regard to missed appointment and need to reschedule  Patient had been hospitalized and is currently in Rehab facility    Will call back when he returns home  Son, Darrius Porter can be reached at [phone]  Thank you

## 2020-03-23 ENCOUNTER — TELEPHONE (OUTPATIENT)
Dept: UROLOGY | Facility: AMBULATORY SURGERY CENTER | Age: 70
End: 2020-03-23

## 2020-03-23 NOTE — TELEPHONE ENCOUNTER
[facility] confirmation that certified letter was vd       Article # [id number]

## 2020-03-24 RX ORDER — ERGOCALCIFEROL 1.25 MG/1
1 CAPSULE ORAL WEEKLY
COMMUNITY
Start: 2020-03-17

## 2020-03-24 RX ORDER — METOPROLOL SUCCINATE 100 MG/1
100 TABLET, EXTENDED RELEASE ORAL 2 TIMES DAILY
COMMUNITY
Start: 2020-01-27 | End: 2021-01-26

## 2020-03-24 RX ORDER — GABAPENTIN 300 MG/1
300 CAPSULE ORAL 3 TIMES DAILY
COMMUNITY
Start: 2020-03-20

## 2020-03-24 RX ORDER — CITALOPRAM 10 MG/1
10 TABLET ORAL DAILY
COMMUNITY
Start: 2020-03-20

## 2020-03-26 ENCOUNTER — TELEMEDICINE (OUTPATIENT)
Dept: ENDOCRINOLOGY | Facility: CLINIC | Age: 70
End: 2020-03-26
Payer: MEDICARE

## 2020-03-26 DIAGNOSIS — IMO0002 UNCONTROLLED TYPE 2 DIABETES MELLITUS, WITH LONG-TERM CURRENT USE OF INSULIN: Primary | ICD-10-CM

## 2020-03-26 DIAGNOSIS — E11.649 UNCONTROLLED TYPE 2 DIABETES MELLITUS WITH HYPOGLYCEMIA, UNSPECIFIED HYPOGLYCEMIA COMA STATUS (HCC): ICD-10-CM

## 2020-03-26 DIAGNOSIS — E78.5 HYPERLIPIDEMIA, UNSPECIFIED HYPERLIPIDEMIA TYPE: ICD-10-CM

## 2020-03-26 DIAGNOSIS — I10 HYPERTENSION, UNSPECIFIED TYPE: ICD-10-CM

## 2020-03-26 DIAGNOSIS — IMO0002 UNCONTROLLED TYPE 2 DIABETES MELLITUS WITH COMPLICATION, WITH LONG-TERM CURRENT USE OF INSULIN: ICD-10-CM

## 2020-03-26 PROCEDURE — G2012 BRIEF CHECK IN BY MD/QHP: HCPCS | Performed by: PHYSICIAN ASSISTANT

## 2020-03-26 RX ORDER — INSULIN ASPART 100 [IU]/ML
INJECTION, SOLUTION INTRAVENOUS; SUBCUTANEOUS
Qty: 66 ML | Refills: 1 | Status: SHIPPED | OUTPATIENT
Start: 2020-03-26 | End: 2020-05-05 | Stop reason: SDUPTHER

## 2020-03-26 NOTE — PATIENT INSTRUCTIONS
Reduce toujeo from 70 units daily to 50 units daily  Reduce Novolog to 18 units with each meal   If you are skiping a meal, SKIP the novolog  Do not take the novolog until you sit down with your food to eat, take it right when you start eating  Check Blood sugar 4x per day and send log in two weeks for review  If you are having any low sugars less than 80, please let us know right away  Follow up in about 1 month- we will order labs at next visit  Complete Diabetic Eye Exam when able  (currently best to stay at home)    Hypoglycemia instructions   Angie Vargas Sr   3/26/2020  [id number]    Low Blood Sugar    Steps to treat low blood sugar  1  Test blood sugar if you have symptoms of low blood sugar:   Low Blood Sugar Symptoms:  o Sweaty  o Dizzy  o Rapid heartbeat  o Shaky    o Bad mood  o Hungry      2  Treat blood sugar less than 70 with 15 grams of fast-acting carbohydrate:   Examples of 15 grams Fast-Acting Carbohydrate:  o 4 oz juice  o 4 oz regular soda  o 3-4 glucose tablets (chew)  o 3-4 hard candies (chew)              3    Wait 15 minutes and test your blood sugar again           4   If blood sugar is less than 100, repeat steps 2-3       5  When your blood sugar is 100 or more, eat a snack if it will be longer than one hour until your next meal  The snack should be 15 grams of carbohydrate and a protein:   Examples of snacks:  o ½ sandwich  o 6 crackers with cheese  o Piece of fruit with cheese or peanut butter  o 6 crackers with peanut butter

## 2020-03-26 NOTE — PROGRESS NOTES
Virtual Regular Visit    Problem List Items Addressed This Visit        Endocrine    Uncontrolled type 2 diabetes mellitus, with long-term current use of insulin ([facility] [address] ) - Primary     Patient recently hospitalized for severe hypoglycemia  Insulin reductions were suggested at time of discharge however he did not reduce insulin and continues to have hypoglycemia  The following instructions were given at time of visit and in AVS, this is a late entry/addenum to chart as when reviewing this note I noticed the info was missing  Reduce toujeo from 70 units daily to 50 units daily  Reduce Novolog to 18 units with each meal   If you are skiping a meal, SKIP the novolog  Do not take the novolog until you sit down with your food to eat, take it right when you start eating  Check Blood sugar 4x per day and send log in two weeks for review  If you are having any low sugars less than 80, please let us know right away  Follow up in about 1 month- we will order labs at next visit  Complete Diabetic Eye Exam when able   (currently best to stay at home)  Lab Results   Component Value Date    HGBA1C 6 9 (H) 02/28/2020            Relevant Medications    sitaGLIPtin (JANUVIA) 50 mg tablet    insulin glargine (TOUJEO) 300 units/mL CONCETRATED U-300 injection pen (1-unit dial)    NOVOLOG FLEXPEN 100 units/mL SOPN       Cardiovascular and Mediastinum    Hypertension    Relevant Medications    metoprolol succinate (TOPROL-XL) 100 mg 24 hr tablet       Other    Hyperlipidemia      Other Visit Diagnoses     Uncontrolled type 2 diabetes mellitus with hypoglycemia, unspecified hypoglycemia coma status (HCC)        Relevant Medications    sitaGLIPtin (JANUVIA) 50 mg tablet    insulin glargine (TOUJEO) 300 units/mL CONCETRATED U-300 injection pen (1-unit dial)    NOVOLOG FLEXPEN 100 units/mL SOPN    Uncontrolled type 2 diabetes mellitus with complication, with long-term current use of insulin (HCC)        Relevant Medications sitaGLIPtin (JANUVIA) 50 mg tablet    insulin glargine (TOUJEO) 300 units/mL CONCETRATED U-300 injection pen (1-unit dial)    NOVOLOG FLEXPEN 100 units/mL SOPN               Reason for visit is Type 2 Diabetes    Encounter provider Nicolas Godoy PA-C    Provider located at [address] [place] [id number]      Recent Visits  Date Type Provider Dept   04/28/20 Telephone [facility] [address]   Showing recent visits within past 7 days and meeting all other requirements     Future Appointments  No visits were found meeting these conditions  Showing future appointments within next 150 days and meeting all other requirements        After connecting through [employer], the patient was identified by name and date of birth  Kal Gay Sr  was informed that this is a telemedicine visit and that the visit is being conducted through telephone which may not be secure and therefore, might not be HIPAA-compliant  My office door was closed  No one else was in the room  He acknowledged consent and understanding of privacy and security of the video platform  The patient has agreed to participate and understands they can discontinue the visit at any time  Subjective  Siobhan Morirs is a 65 y o  male with history of Type 2 Diabetes  He was recently admitted to [facility] 2/24 for symptomatic hypoglycemia and found to have bradycardia so was transferred to [facility]  He reports that he took novolog without eating as reason for hypoglycemia  Hospital reports suggested reducing Toujeo to 32 units however he has continued to take 70 units daily   He was in [facility] rehab until 3/19  Reports blood sugars have been variable at home, still having some hypoglycemia  Needs eye exam, UTD on foot exam  Checking feet daily      Taking rest of meds as directed       Past Medical History:   Diagnosis Date    [facility]     Diabetes mellitus ([facility] )     Malignant neoplasm of prostate ([facility] )        Past Surgical History:   Procedure Laterality Date    CARDIAC SURGERY         Current Outpatient Medications   Medication Sig Dispense Refill    fluticasone-salmeterol (ADVAIR, WIXELA) 250-50 mcg/dose inhaler Inhale 1 puff 2 (two) times a day      gabapentin (NEURONTIN) 300 mg capsule Take 300 mg by mouth Three times a day      metoprolol succinate (TOPROL-XL) 100 mg 24 hr tablet Take 100 mg by mouth 2 (two) times a day      sitaGLIPtin (JANUVIA) 50 mg tablet Take 50 mg by mouth daily      aspirin 325 mg tablet Take 1 tablet by mouth daily      atorvastatin (LIPITOR) 40 mg tablet Take 1 tablet by mouth daily      bicalutamide (CASODEX) 50 mg tablet Take 1 tablet by mouth Daily      Blood Glucose Monitoring Suppl (ONE TOUCH ULTRA 2) w/Device KIT by Does not apply route      citalopram (CeleXA) 10 mg tablet Take 10 mg by mouth daily      doxycycline (ADOXA) 100 MG tablet Take 100 mg by mouth 2 (two) times a day (for 6 days)      ergocalciferol (VITAMIN D2) 50,000 units Take 1 capsule by mouth once a week      famotidine (PEPCID) 20 mg tablet Take 1 tablet by mouth 2 (two) times a day      glucose blood (ONE TOUCH ULTRA TEST) test strip Pt to test blood sugar 4 times a day 400 each 2    hydrochlorothiazide (HYDRODIURIL) 12 5 mg tablet Take 1 tablet by mouth daily      insulin glargine (TOUJEO) 300 units/mL CONCETRATED U-300 injection pen (1-unit dial) Inject 50 Units under the skin daily 10 pen 0    Insulin Pen Needle 32G X 4 MM MISC Use 4 daily 360 each 1    lisinopril (ZESTRIL) 2 5 mg tablet Take 1 tablet by mouth daily      NOVOLOG FLEXPEN 100 units/mL SOPN 18 units with each meal 66 mL 1    ONETOUCH DELICA LANCETS 03M MISC test blood sugar 3 times a day 300 each 1    prasugrel (EFFIENT) tablet Take 1 tablet by mouth daily       No current facility-administered medications for this visit  No Known Allergies    Review of Systems      I spent 15 minutes with the patient during this visit

## 2020-04-08 ENCOUNTER — TELEPHONE (OUTPATIENT)
Dept: UROLOGY | Facility: AMBULATORY SURGERY CENTER | Age: 70
End: 2020-04-08

## 2020-04-16 ENCOUNTER — OFFICE VISIT (OUTPATIENT)
Dept: UROLOGY | Facility: AMBULATORY SURGERY CENTER | Age: 70
End: 2020-04-16
Payer: MEDICARE

## 2020-04-16 VITALS
HEART RATE: 82 BPM | HEIGHT: 65 IN | BODY MASS INDEX: 37.51 KG/M2 | DIASTOLIC BLOOD PRESSURE: 62 MMHG | SYSTOLIC BLOOD PRESSURE: 122 MMHG

## 2020-04-16 DIAGNOSIS — C61 PROSTATE CANCER (HCC): Primary | ICD-10-CM

## 2020-04-16 PROCEDURE — 96402 CHEMO HORMON ANTINEOPL SQ/IM: CPT

## 2020-04-16 PROCEDURE — 99213 OFFICE O/P EST LOW 20 MIN: CPT

## 2020-04-28 ENCOUNTER — TELEPHONE (OUTPATIENT)
Dept: ENDOCRINOLOGY | Facility: CLINIC | Age: 70
End: 2020-04-28

## 2020-04-29 NOTE — ASSESSMENT & PLAN NOTE
Patient recently hospitalized for severe hypoglycemia  Insulin reductions were suggested at time of discharge however he did not reduce insulin and continues to have hypoglycemia  The following instructions were given at time of visit and in AVS, this is a late entry/addenum to chart as when reviewing this note I noticed the info was missing  Reduce toujeo from 70 units daily to 50 units daily  Reduce Novolog to 18 units with each meal   If you are skiping a meal, SKIP the novolog  Do not take the novolog until you sit down with your food to eat, take it right when you start eating  Check Blood sugar 4x per day and send log in two weeks for review  If you are having any low sugars less than 80, please let us know right away  Follow up in about 1 month- we will order labs at next visit  Complete Diabetic Eye Exam when able   (currently best to stay at home)  Lab Results   Component Value Date    HGBA1C 6 9 (H) 02/28/2020

## 2020-04-30 ENCOUNTER — TELEPHONE (OUTPATIENT)
Dept: ENDOCRINOLOGY | Facility: CLINIC | Age: 70
End: 2020-04-30

## 2020-05-05 ENCOUNTER — TELEMEDICINE (OUTPATIENT)
Dept: ENDOCRINOLOGY | Facility: CLINIC | Age: 70
End: 2020-05-05
Payer: MEDICARE

## 2020-05-05 DIAGNOSIS — IMO0002 UNCONTROLLED TYPE 2 DIABETES MELLITUS, WITH LONG-TERM CURRENT USE OF INSULIN: Primary | ICD-10-CM

## 2020-05-05 DIAGNOSIS — I10 HYPERTENSION, UNSPECIFIED TYPE: ICD-10-CM

## 2020-05-05 DIAGNOSIS — E55.9 VITAMIN D DEFICIENCY: ICD-10-CM

## 2020-05-05 DIAGNOSIS — E16.2 HYPOGLYCEMIA: ICD-10-CM

## 2020-05-05 DIAGNOSIS — IMO0002 UNCONTROLLED TYPE 2 DIABETES MELLITUS WITH COMPLICATION, WITH LONG-TERM CURRENT USE OF INSULIN: ICD-10-CM

## 2020-05-05 DIAGNOSIS — E78.5 HYPERLIPIDEMIA, UNSPECIFIED HYPERLIPIDEMIA TYPE: ICD-10-CM

## 2020-05-05 PROCEDURE — 99441 PR PHYS/QHP TELEPHONE EVALUATION 5-10 MIN: CPT | Performed by: PHYSICIAN ASSISTANT

## 2020-05-05 RX ORDER — INSULIN ASPART 100 [IU]/ML
INJECTION, SOLUTION INTRAVENOUS; SUBCUTANEOUS
Qty: 66 ML | Refills: 1 | Status: SHIPPED | OUTPATIENT
Start: 2020-05-05

## 2020-05-07 ENCOUNTER — TELEPHONE (OUTPATIENT)
Dept: ENDOCRINOLOGY | Facility: CLINIC | Age: 70
End: 2020-05-07

## 2020-05-20 DIAGNOSIS — IMO0002 UNCONTROLLED TYPE 2 DIABETES MELLITUS WITH COMPLICATION, WITH LONG-TERM CURRENT USE OF INSULIN: ICD-10-CM

## 2020-05-20 RX ORDER — PEN NEEDLE, DIABETIC 32GX 5/32"
NEEDLE, DISPOSABLE MISCELLANEOUS
Qty: 400 EACH | Refills: 3 | Status: SHIPPED | OUTPATIENT
Start: 2020-05-20

## 2020-05-27 DIAGNOSIS — Z79.4 TYPE 2 DIABETES MELLITUS WITH COMPLICATION, WITH LONG-TERM CURRENT USE OF INSULIN (HCC): ICD-10-CM

## 2020-05-27 DIAGNOSIS — E11.8 TYPE 2 DIABETES MELLITUS WITH COMPLICATION, WITH LONG-TERM CURRENT USE OF INSULIN (HCC): ICD-10-CM

## 2020-05-27 DIAGNOSIS — E11.65 TYPE 2 DIABETES MELLITUS WITH HYPERGLYCEMIA (HCC): ICD-10-CM

## 2020-05-27 RX ORDER — LANCETS 33 GAUGE
EACH MISCELLANEOUS
Qty: 300 EACH | Refills: 1 | Status: SHIPPED | OUTPATIENT
Start: 2020-05-27

## 2020-05-28 DIAGNOSIS — Z79.4 TYPE 2 DIABETES MELLITUS WITH COMPLICATION, WITH LONG-TERM CURRENT USE OF INSULIN (HCC): ICD-10-CM

## 2020-05-28 DIAGNOSIS — E11.8 TYPE 2 DIABETES MELLITUS WITH COMPLICATION, WITH LONG-TERM CURRENT USE OF INSULIN (HCC): ICD-10-CM

## 2020-06-04 ENCOUNTER — TELEPHONE (OUTPATIENT)
Dept: ENDOCRINOLOGY | Facility: CLINIC | Age: 70
End: 2020-06-04

## 2020-06-09 ENCOUNTER — TELEPHONE (OUTPATIENT)
Dept: ENDOCRINOLOGY | Facility: CLINIC | Age: 70
End: 2020-06-09

## 2020-06-09 RX ORDER — FUROSEMIDE 20 MG/1
20 TABLET ORAL EVERY OTHER DAY
COMMUNITY
Start: 2020-06-04

## 2020-06-11 ENCOUNTER — TELEMEDICINE (OUTPATIENT)
Dept: ENDOCRINOLOGY | Facility: CLINIC | Age: 70
End: 2020-06-11
Payer: MEDICARE

## 2020-06-11 DIAGNOSIS — E78.5 HYPERLIPIDEMIA, UNSPECIFIED HYPERLIPIDEMIA TYPE: ICD-10-CM

## 2020-06-11 DIAGNOSIS — Z79.4 TYPE 2 DIABETES MELLITUS WITH COMPLICATION, WITH LONG-TERM CURRENT USE OF INSULIN (HCC): ICD-10-CM

## 2020-06-11 DIAGNOSIS — I10 HYPERTENSION, UNSPECIFIED TYPE: ICD-10-CM

## 2020-06-11 DIAGNOSIS — E11.649 UNCONTROLLED TYPE 2 DIABETES MELLITUS WITH HYPOGLYCEMIA, UNSPECIFIED HYPOGLYCEMIA COMA STATUS (HCC): ICD-10-CM

## 2020-06-11 DIAGNOSIS — IMO0002 UNCONTROLLED TYPE 2 DIABETES MELLITUS, WITH LONG-TERM CURRENT USE OF INSULIN: Primary | ICD-10-CM

## 2020-06-11 DIAGNOSIS — E11.8 TYPE 2 DIABETES MELLITUS WITH COMPLICATION, WITH LONG-TERM CURRENT USE OF INSULIN (HCC): ICD-10-CM

## 2020-06-11 PROCEDURE — 99442 PR PHYS/QHP TELEPHONE EVALUATION 11-20 MIN: CPT | Performed by: PHYSICIAN ASSISTANT

## 2020-06-23 ENCOUNTER — RADIATION ONCOLOGY FOLLOW-UP (OUTPATIENT)
Dept: RADIATION ONCOLOGY | Facility: HOSPITAL | Age: 70
End: 2020-06-23
Attending: RADIOLOGY
Payer: MEDICARE

## 2020-06-23 VITALS — WEIGHT: 236 LBS | BODY MASS INDEX: 39.32 KG/M2 | HEIGHT: 65 IN

## 2020-06-23 DIAGNOSIS — C61 PROSTATE CANCER (HCC): Primary | ICD-10-CM

## 2020-06-23 PROCEDURE — 99211 OFF/OP EST MAY X REQ PHY/QHP: CPT | Performed by: RADIOLOGY

## 2020-07-20 ENCOUNTER — TELEPHONE (OUTPATIENT)
Dept: UROLOGY | Facility: AMBULATORY SURGERY CENTER | Age: 70
End: 2020-07-20

## 2020-07-20 NOTE — TELEPHONE ENCOUNTER
Spoke to son and he stated they would like to cancel the appointment due to his father being on hospice  I offered we could do a virtual appointment and patient son said he will call back because he is at work  Artem Brandon

## 2020-08-03 DIAGNOSIS — J43.2 CENTRILOBULAR EMPHYSEMA (HCC): Primary | ICD-10-CM

## 2020-11-28 DIAGNOSIS — Z79.4 TYPE 2 DIABETES MELLITUS WITH COMPLICATION, WITH LONG-TERM CURRENT USE OF INSULIN (HCC): ICD-10-CM

## 2020-11-28 DIAGNOSIS — E11.8 TYPE 2 DIABETES MELLITUS WITH COMPLICATION, WITH LONG-TERM CURRENT USE OF INSULIN (HCC): ICD-10-CM

## 2020-11-30 DIAGNOSIS — E11.8 TYPE 2 DIABETES MELLITUS WITH COMPLICATION, WITH LONG-TERM CURRENT USE OF INSULIN (HCC): ICD-10-CM

## 2020-11-30 DIAGNOSIS — Z79.4 TYPE 2 DIABETES MELLITUS WITH COMPLICATION, WITH LONG-TERM CURRENT USE OF INSULIN (HCC): ICD-10-CM

## 2020-11-30 RX ORDER — BLOOD SUGAR DIAGNOSTIC
STRIP MISCELLANEOUS
Qty: 300 EACH | Refills: 1 | Status: SHIPPED | OUTPATIENT
Start: 2020-11-30 | End: 2020-12-28

## 2020-11-30 RX ORDER — BLOOD SUGAR DIAGNOSTIC
STRIP MISCELLANEOUS
Qty: 300 EACH | Refills: 1 | Status: SHIPPED | OUTPATIENT
Start: 2020-11-30 | End: 2020-11-30

## 2020-12-28 DIAGNOSIS — Z79.4 TYPE 2 DIABETES MELLITUS WITH COMPLICATION, WITH LONG-TERM CURRENT USE OF INSULIN (HCC): ICD-10-CM

## 2020-12-28 DIAGNOSIS — E11.8 TYPE 2 DIABETES MELLITUS WITH COMPLICATION, WITH LONG-TERM CURRENT USE OF INSULIN (HCC): ICD-10-CM

## 2020-12-28 RX ORDER — BLOOD SUGAR DIAGNOSTIC
STRIP MISCELLANEOUS
Qty: 300 EACH | Refills: 1 | Status: SHIPPED | OUTPATIENT
Start: 2020-12-28

## 2021-03-02 ENCOUNTER — TELEPHONE (OUTPATIENT)
Dept: PULMONOLOGY | Facility: CLINIC | Age: 71
End: 2021-03-02

## 2021-03-02 NOTE — TELEPHONE ENCOUNTER
Note to Chart:  3/2/2021 Pt's son said pt is in hospice and won't be coming to any appts  Was former Dr Corrine Larry pt